# Patient Record
Sex: MALE | Race: WHITE | NOT HISPANIC OR LATINO | Employment: FULL TIME | URBAN - METROPOLITAN AREA
[De-identification: names, ages, dates, MRNs, and addresses within clinical notes are randomized per-mention and may not be internally consistent; named-entity substitution may affect disease eponyms.]

---

## 2017-08-09 ENCOUNTER — OFFICE VISIT (OUTPATIENT)
Dept: MEDICAL GROUP | Facility: MEDICAL CENTER | Age: 57
End: 2017-08-09
Payer: COMMERCIAL

## 2017-08-09 VITALS
HEART RATE: 65 BPM | WEIGHT: 194 LBS | DIASTOLIC BLOOD PRESSURE: 72 MMHG | OXYGEN SATURATION: 96 % | RESPIRATION RATE: 20 BRPM | TEMPERATURE: 98.8 F | SYSTOLIC BLOOD PRESSURE: 124 MMHG | HEIGHT: 71 IN | BODY MASS INDEX: 27.16 KG/M2

## 2017-08-09 DIAGNOSIS — Z00.00 PREVENTATIVE HEALTH CARE: ICD-10-CM

## 2017-08-09 DIAGNOSIS — Z12.5 SCREENING FOR PROSTATE CANCER: ICD-10-CM

## 2017-08-09 DIAGNOSIS — M10.9 GOUT INVOLVING TOE, UNSPECIFIED CAUSE, UNSPECIFIED CHRONICITY, UNSPECIFIED LATERALITY: ICD-10-CM

## 2017-08-09 DIAGNOSIS — Z23 NEED FOR VACCINATION: ICD-10-CM

## 2017-08-09 DIAGNOSIS — D17.24 LIPOMA OF LEFT LOWER EXTREMITY: ICD-10-CM

## 2017-08-09 PROCEDURE — 99214 OFFICE O/P EST MOD 30 MIN: CPT | Performed by: PHYSICIAN ASSISTANT

## 2017-08-09 RX ORDER — INDOMETHACIN 50 MG/1
50 CAPSULE ORAL 3 TIMES DAILY
Qty: 30 CAP | Refills: 2 | Status: SHIPPED | OUTPATIENT
Start: 2017-08-09 | End: 2019-06-29

## 2017-08-09 ASSESSMENT — PATIENT HEALTH QUESTIONNAIRE - PHQ9: CLINICAL INTERPRETATION OF PHQ2 SCORE: 0

## 2017-08-09 NOTE — PROGRESS NOTES
Subjective:   CC: Abelino Dick is a 57 y.o. male here today for new onset gout attack.    Gout  Had a gout attack in L big toe 3 week ago. Then pain affected L knee and then R big toe. Had one attack 2-3 yrs ago. Pt stopped fish oil and baby aspirin. Started taking ibuprofen 600 mg 3-4 day for pain. Pain and swelling and erythema all resolved now. Patient gets gout attacks may infrequently. States recently his job has been more stressful and he has been drinking and eating Mexican food including beans a lot.     Patient also complains of a lump in the left lower back that has been there for several years. States it was evaluated in the past with ultrasound and he was told it is a benign fatty tumor. States since he has lost weight it is more noticeable causing discomfort.         Current medicines (including changes today)  Current Outpatient Prescriptions   Medication Sig Dispense Refill   • indomethacin (INDOCIN) 50 MG Cap Take 1 Cap by mouth 3 times a day. Until pain resolved 30 Cap 2   • colchicine (COLCRYS) 0.6 MG TABS Take 1 Tab by mouth every day. 2 tabs PO at onset of flare then 1 tab PO 1 hour later.  Total dose 1.8 mg. (Patient not taking: Reported on 8/9/2017) 3 Tab 3   • methylPREDNISolone (MEDROL DOSPACK) 4 MG TABS Take as directed (Patient not taking: Reported on 8/9/2017) 1 Tab 0   • hydrocodone-acetaminophen (NORCO) 7.5-325 MG per tablet Take 1-2 Tabs by mouth every 6 hours as needed. (Patient not taking: Reported on 8/9/2017) 20 Tab 0   • aspirin 81 MG tablet Take 81 mg by mouth every day.       No current facility-administered medications for this visit.         Past medical, surgical, family, and social history are reviewed in Epic chart by me today.   Medications and allergies reviewed in Epic chart by me today.         ROS   No chest pain, no shortness of breath, no abdominal pain  As documented in history of present illness above     Objective:     Blood pressure 124/72, pulse 65, temperature  "37.1 °C (98.8 °F), resp. rate 20, height 1.803 m (5' 10.98\"), weight 87.998 kg (194 lb), SpO2 96 %. Body mass index is 27.07 kg/(m^2).   Physical Exam:  Constitutional: Alert, oriented in no acute distress.  Psych: Eye contact is good, speech goal directed, affect calm  Eyes: Conjunctiva non-injected, sclera non-icteric.    Skin: no lesions in visible areas.  Ext: no edema, color normal, vascularity normal, temperature normal        Assessment and Plan:   The following treatment plan was discussed    1. Preventative health care    - CBC WITH DIFFERENTIAL; Future  - COMP METABOLIC PANEL; Future  - LIPID PROFILE; Future  - TSH WITH REFLEX TO FT4; Future  - VITAMIN D,25 HYDROXY; Future    2. Gout involving toe, unspecified cause, unspecified chronicity, unspecified laterality  Patient gets gout attacks infrequently. Does not want to do everyday by mouth   We'll treat symptomatically when gout attack happens.  - URIC ACID, SERUM  - indomethacin (INDOCIN) 50 MG Cap; Take 1 Cap by mouth 3 times a day. Until pain resolved  Dispense: 30 Cap; Refill: 2    3. Screening for prostate cancer    - PROSTATE SPECIFIC AG SCREENING; Future    4. Lipoma of left lower extremity    - REFERRAL TO GENERAL SURGERY    5. Need for vaccination  Patient declined Tdap today      Followup: Return if symptoms worsen or fail to improve.         Please note that this dictation was created using voice recognition software. I have made every reasonable attempt to correct obvious errors, but I expect that there are errors of grammar and possibly content that I did not discover before finalizing the note.    "

## 2017-08-09 NOTE — ASSESSMENT & PLAN NOTE
Had a gout attack in L big toe 3 week ago. Then pain affected L knee and then R big toe. Had one attack 2-3 yrs ago. Pt stopped fish oil and baby aspirin. Started taking ibuprofen 600 mg 3-4 day for pain. Pain and swelling and erythema all resolved now.

## 2019-06-29 ENCOUNTER — OFFICE VISIT (OUTPATIENT)
Dept: URGENT CARE | Facility: CLINIC | Age: 59
End: 2019-06-29
Payer: COMMERCIAL

## 2019-06-29 VITALS
WEIGHT: 194 LBS | HEART RATE: 62 BPM | TEMPERATURE: 98.5 F | SYSTOLIC BLOOD PRESSURE: 140 MMHG | OXYGEN SATURATION: 95 % | RESPIRATION RATE: 16 BRPM | BODY MASS INDEX: 27.16 KG/M2 | HEIGHT: 71 IN | DIASTOLIC BLOOD PRESSURE: 88 MMHG

## 2019-06-29 DIAGNOSIS — K04.7 DENTAL INFECTION: ICD-10-CM

## 2019-06-29 PROCEDURE — 99204 OFFICE O/P NEW MOD 45 MIN: CPT | Performed by: NURSE PRACTITIONER

## 2019-06-29 RX ORDER — AMOXICILLIN AND CLAVULANATE POTASSIUM 875; 125 MG/1; MG/1
1 TABLET, FILM COATED ORAL 2 TIMES DAILY
Qty: 14 TAB | Refills: 0 | Status: SHIPPED | OUTPATIENT
Start: 2019-06-29 | End: 2019-07-06

## 2019-06-29 NOTE — PROGRESS NOTES
Chief Complaint   Patient presents with   • Dental Pain     x yesterday, Lt. lowe tooth pain and pain on Lt. jaw       HISTORY OF PRESENT ILLNESS: Patient is a 59 y.o. male who presents to urgent care today with complaints of dental pain.  The patient reports that for the past 3 days he has had pain to his left lower most posterior molar.  He has an appointment with a dentist on Monday but would like to be evaluated today.  He denies any fever, chills, malaise.  He is taken ibuprofen for symptom relief.  He notes breaking this tooth several years ago, has not had problems since.    Patient Active Problem List    Diagnosis Date Noted   • Left foot pain 2015   • Gout 2015   • Hyperlipidemia 2015       Allergies:Patient has no known allergies.    Current Outpatient Prescriptions Ordered in [facility]   Medication Sig Dispense Refill   • amoxicillin-clavulanate (AUGMENTIN) 875-125 MG Tab Take 1 Tab by mouth 2 times a day for 7 days. 14 Tab 0   • aspirin 81 MG tablet Take 81 mg by mouth every day.       No current [facility]-ordered facility-administered medications on file.        Past Medical History:   Diagnosis Date   • Gout        Social History   Substance Use Topics   • Smoking status: Never Smoker   • Smokeless tobacco: Never Used   • Alcohol use Yes      Comment: occ a beer       Family Status   Relation Status   • Mo    • Fa    • Sis Alive     Family History   Problem Relation Age of Onset   • Cancer Mother         Lung CA   • Heart Disease Father        ROS:  Review of Systems   Constitutional: Negative for fever, chills, weight loss, malaise, and fatigue.   HENT: Positive for dental pain.  Negative for ear pain, nosebleeds, congestion, sore throat and neck pain.    Eyes: Negative for vision changes.   Neuro: Negative for headache, sensory changes, weakness, seizure, LOC.   Cardiovascular: Negative for chest pain, palpitations, orthopnea and leg swelling.   Respiratory: Negative for  "cough, sputum production, shortness of breath and wheezing.   Gastrointestinal: Negative for abdominal pain, nausea, vomiting or diarrhea.   Musculoskeletal: Negative for falls, neck pain, back pain, joint pain, myalgias.   Skin: Negative for rash, diaphoresis.     Exam:  /88 (BP Location: Left arm, Patient Position: Sitting, BP Cuff Size: Adult)   Pulse 62   Temp 36.9 °C (98.5 °F) (Temporal)   Resp 16   Ht 1.803 m (5' 10.98\")   Wt 88 kg (194 lb)   SpO2 95%   General: well-nourished, well-developed male in NAD  Head: normocephalic, atraumatic  Eyes: PERRLA, no conjunctival injection, acuity grossly intact, lids normal.  Ears: normal shape and symmetry, no tenderness, no discharge. External canals are without any significant edema or erythema. Tympanic membranes are without any inflammation, no effusion. Gross auditory acuity is intact.  Nose: symmetrical without tenderness, no discharge.  Mouth/Throat: Scattered caries throughout.  Left, bottom, most posterior molar appears to have old fracture, surrounding tissue is mildly swollen and tender, no abscess or significant erythema, no drainage.  Able to fully masticate without difficulty.  No pharyngeal erythema, exudates or tonsillar enlargement.  Neck: no masses, range of motion within normal limits, no tracheal deviation. No obvious thyroid enlargement.   Lymph: no cervical adenopathy. No supraclavicular adenopathy.   Neuro: alert and oriented. Cranial nerves 1-12 grossly intact. No sensory deficit.   Cardiovascular: regular rate and rhythm. No edema.  Pulmonary: no distress. Chest is symmetrical with respiration, no wheezes, crackles, or rhonchi.   Musculoskeletal: no clubbing, appropriate muscle tone, gait is stable.  Skin: warm, dry, intact, no clubbing, no cyanosis, no rashes.   Psych: appropriate mood, affect, judgement.         Assessment/Plan:  1. Dental infection  amoxicillin-clavulanate (AUGMENTIN) 875-125 MG Tab       Suspect early infection, " Augmentin as directed.  Probiotic use encouraged.  OTC Tylenol Motrin for pain relief.  Follow-up with dentist on Monday as planned.  Supportive care, differential diagnoses, and indications for immediate follow-up discussed with patient.   Pathogenesis of diagnosis discussed including typical length and natural progression.   Instructed to return to clinic or nearest emergency department for any change in condition, further concerns, or worsening of symptoms.  Patient states understanding of the plan of care and discharge instructions.          Please note that this dictation was created using voice recognition software. I have made every reasonable attempt to correct obvious errors, but I expect that there are errors of grammar and possibly content that I did not discover before finalizing the note.      SHIELA Durbin.

## 2020-02-10 ENCOUNTER — TELEPHONE (OUTPATIENT)
Dept: SCHEDULING | Facility: IMAGING CENTER | Age: 60
End: 2020-02-10

## 2020-02-12 ENCOUNTER — APPOINTMENT (OUTPATIENT)
Dept: RADIOLOGY | Facility: MEDICAL CENTER | Age: 60
End: 2020-02-12
Attending: EMERGENCY MEDICINE
Payer: COMMERCIAL

## 2020-02-12 ENCOUNTER — APPOINTMENT (OUTPATIENT)
Dept: RADIOLOGY | Facility: MEDICAL CENTER | Age: 60
End: 2020-02-12
Attending: PSYCHIATRY & NEUROLOGY
Payer: COMMERCIAL

## 2020-02-12 ENCOUNTER — APPOINTMENT (OUTPATIENT)
Dept: CARDIOLOGY | Facility: MEDICAL CENTER | Age: 60
End: 2020-02-12
Attending: PSYCHIATRY & NEUROLOGY
Payer: COMMERCIAL

## 2020-02-12 ENCOUNTER — HOSPITAL ENCOUNTER (EMERGENCY)
Facility: MEDICAL CENTER | Age: 60
End: 2020-02-12
Attending: EMERGENCY MEDICINE
Payer: COMMERCIAL

## 2020-02-12 VITALS
OXYGEN SATURATION: 95 % | DIASTOLIC BLOOD PRESSURE: 100 MMHG | WEIGHT: 201.5 LBS | RESPIRATION RATE: 20 BRPM | SYSTOLIC BLOOD PRESSURE: 151 MMHG | HEIGHT: 71 IN | BODY MASS INDEX: 28.21 KG/M2 | HEART RATE: 64 BPM | TEMPERATURE: 98.2 F

## 2020-02-12 DIAGNOSIS — I63.10 CEREBROVASCULAR ACCIDENT (CVA) DUE TO EMBOLISM OF PRECEREBRAL ARTERY (HCC): ICD-10-CM

## 2020-02-12 DIAGNOSIS — G45.9 TIA (TRANSIENT ISCHEMIC ATTACK): ICD-10-CM

## 2020-02-12 LAB
ALBUMIN SERPL BCP-MCNC: 4.8 G/DL (ref 3.2–4.9)
ALBUMIN/GLOB SERPL: 1.5 G/DL
ALP SERPL-CCNC: 35 U/L (ref 30–99)
ALT SERPL-CCNC: 27 U/L (ref 2–50)
ANION GAP SERPL CALC-SCNC: 10 MMOL/L (ref 0–11.9)
APTT PPP: 23.2 SEC (ref 24.7–36)
AST SERPL-CCNC: 31 U/L (ref 12–45)
BASOPHILS # BLD AUTO: 0.7 % (ref 0–1.8)
BASOPHILS # BLD: 0.06 K/UL (ref 0–0.12)
BILIRUB SERPL-MCNC: 1.2 MG/DL (ref 0.1–1.5)
BUN SERPL-MCNC: 19 MG/DL (ref 8–22)
CALCIUM SERPL-MCNC: 9.8 MG/DL (ref 8.5–10.5)
CHLORIDE SERPL-SCNC: 103 MMOL/L (ref 96–112)
CHOLEST SERPL-MCNC: 234 MG/DL (ref 100–199)
CO2 SERPL-SCNC: 24 MMOL/L (ref 20–33)
CREAT SERPL-MCNC: 1.22 MG/DL (ref 0.5–1.4)
EKG IMPRESSION: NORMAL
EOSINOPHIL # BLD AUTO: 0.04 K/UL (ref 0–0.51)
EOSINOPHIL NFR BLD: 0.4 % (ref 0–6.9)
ERYTHROCYTE [DISTWIDTH] IN BLOOD BY AUTOMATED COUNT: 42.1 FL (ref 35.9–50)
EST. AVERAGE GLUCOSE BLD GHB EST-MCNC: 103 MG/DL
GLOBULIN SER CALC-MCNC: 3.1 G/DL (ref 1.9–3.5)
GLUCOSE BLD-MCNC: 96 MG/DL (ref 65–99)
GLUCOSE SERPL-MCNC: 104 MG/DL (ref 65–99)
HBA1C MFR BLD: 5.2 % (ref 0–5.6)
HCT VFR BLD AUTO: 51.2 % (ref 42–52)
HDLC SERPL-MCNC: 62 MG/DL
HGB BLD-MCNC: 17.7 G/DL (ref 14–18)
IMM GRANULOCYTES # BLD AUTO: 0.07 K/UL (ref 0–0.11)
IMM GRANULOCYTES NFR BLD AUTO: 0.8 % (ref 0–0.9)
LDLC SERPL CALC-MCNC: 143 MG/DL
LV EJECT FRACT  99904: 60
LV EJECT FRACT MOD 2C 99903: 61.88
LV EJECT FRACT MOD 4C 99902: 57.53
LV EJECT FRACT MOD BP 99901: 58
LYMPHOCYTES # BLD AUTO: 1.55 K/UL (ref 1–4.8)
LYMPHOCYTES NFR BLD: 17 % (ref 22–41)
MCH RBC QN AUTO: 31.9 PG (ref 27–33)
MCHC RBC AUTO-ENTMCNC: 34.6 G/DL (ref 33.7–35.3)
MCV RBC AUTO: 92.4 FL (ref 81.4–97.8)
MONOCYTES # BLD AUTO: 0.64 K/UL (ref 0–0.85)
MONOCYTES NFR BLD AUTO: 7 % (ref 0–13.4)
NEUTROPHILS # BLD AUTO: 6.78 K/UL (ref 1.82–7.42)
NEUTROPHILS NFR BLD: 74.1 % (ref 44–72)
NRBC # BLD AUTO: 0 K/UL
NRBC BLD-RTO: 0 /100 WBC
PLATELET # BLD AUTO: 321 K/UL (ref 164–446)
PMV BLD AUTO: 9.9 FL (ref 9–12.9)
POTASSIUM SERPL-SCNC: 4.8 MMOL/L (ref 3.6–5.5)
PROT SERPL-MCNC: 7.9 G/DL (ref 6–8.2)
RBC # BLD AUTO: 5.54 M/UL (ref 4.7–6.1)
SODIUM SERPL-SCNC: 137 MMOL/L (ref 135–145)
TRIGL SERPL-MCNC: 145 MG/DL (ref 0–149)
TROPONIN T SERPL-MCNC: 11 NG/L (ref 6–19)
WBC # BLD AUTO: 9.1 K/UL (ref 4.8–10.8)

## 2020-02-12 PROCEDURE — 93306 TTE W/DOPPLER COMPLETE: CPT

## 2020-02-12 PROCEDURE — 81240 F2 GENE: CPT

## 2020-02-12 PROCEDURE — 36415 COLL VENOUS BLD VENIPUNCTURE: CPT

## 2020-02-12 PROCEDURE — 85303 CLOT INHIBIT PROT C ACTIVITY: CPT

## 2020-02-12 PROCEDURE — 80061 LIPID PANEL: CPT

## 2020-02-12 PROCEDURE — A9270 NON-COVERED ITEM OR SERVICE: HCPCS | Performed by: EMERGENCY MEDICINE

## 2020-02-12 PROCEDURE — 93880 EXTRACRANIAL BILAT STUDY: CPT

## 2020-02-12 PROCEDURE — 85300 ANTITHROMBIN III ACTIVITY: CPT

## 2020-02-12 PROCEDURE — 700102 HCHG RX REV CODE 250 W/ 637 OVERRIDE(OP): Performed by: PSYCHIATRY & NEUROLOGY

## 2020-02-12 PROCEDURE — 84484 ASSAY OF TROPONIN QUANT: CPT

## 2020-02-12 PROCEDURE — 85306 CLOT INHIBIT PROT S FREE: CPT

## 2020-02-12 PROCEDURE — 99285 EMERGENCY DEPT VISIT HI MDM: CPT

## 2020-02-12 PROCEDURE — 94760 N-INVAS EAR/PLS OXIMETRY 1: CPT

## 2020-02-12 PROCEDURE — 93306 TTE W/DOPPLER COMPLETE: CPT | Mod: 26 | Performed by: INTERNAL MEDICINE

## 2020-02-12 PROCEDURE — 85730 THROMBOPLASTIN TIME PARTIAL: CPT

## 2020-02-12 PROCEDURE — 81241 F5 GENE: CPT

## 2020-02-12 PROCEDURE — 700102 HCHG RX REV CODE 250 W/ 637 OVERRIDE(OP): Performed by: EMERGENCY MEDICINE

## 2020-02-12 PROCEDURE — 80053 COMPREHEN METABOLIC PANEL: CPT

## 2020-02-12 PROCEDURE — 71045 X-RAY EXAM CHEST 1 VIEW: CPT

## 2020-02-12 PROCEDURE — 70496 CT ANGIOGRAPHY HEAD: CPT

## 2020-02-12 PROCEDURE — 83036 HEMOGLOBIN GLYCOSYLATED A1C: CPT

## 2020-02-12 PROCEDURE — 70498 CT ANGIOGRAPHY NECK: CPT

## 2020-02-12 PROCEDURE — 700117 HCHG RX CONTRAST REV CODE 255: Performed by: PSYCHIATRY & NEUROLOGY

## 2020-02-12 PROCEDURE — 99284 EMERGENCY DEPT VISIT MOD MDM: CPT | Performed by: NURSE PRACTITIONER

## 2020-02-12 PROCEDURE — 700105 HCHG RX REV CODE 258: Performed by: PSYCHIATRY & NEUROLOGY

## 2020-02-12 PROCEDURE — A9270 NON-COVERED ITEM OR SERVICE: HCPCS | Performed by: PSYCHIATRY & NEUROLOGY

## 2020-02-12 PROCEDURE — 85025 COMPLETE CBC W/AUTO DIFF WBC: CPT

## 2020-02-12 PROCEDURE — 93005 ELECTROCARDIOGRAM TRACING: CPT | Performed by: EMERGENCY MEDICINE

## 2020-02-12 PROCEDURE — 82962 GLUCOSE BLOOD TEST: CPT

## 2020-02-12 PROCEDURE — 70551 MRI BRAIN STEM W/O DYE: CPT

## 2020-02-12 RX ORDER — ATORVASTATIN CALCIUM 20 MG/1
40 TABLET, FILM COATED ORAL EVERY EVENING
Status: DISCONTINUED | OUTPATIENT
Start: 2020-02-12 | End: 2020-02-12 | Stop reason: HOSPADM

## 2020-02-12 RX ORDER — SODIUM CHLORIDE 9 MG/ML
1000 INJECTION, SOLUTION INTRAVENOUS ONCE
Status: DISCONTINUED | OUTPATIENT
Start: 2020-02-12 | End: 2020-02-12

## 2020-02-12 RX ORDER — SODIUM CHLORIDE 9 MG/ML
500 INJECTION, SOLUTION INTRAVENOUS ONCE
Status: DISCONTINUED | OUTPATIENT
Start: 2020-02-12 | End: 2020-02-12

## 2020-02-12 RX ORDER — ATORVASTATIN CALCIUM 40 MG/1
40 TABLET, FILM COATED ORAL DAILY
Qty: 30 TAB | Refills: 0 | Status: SHIPPED | OUTPATIENT
Start: 2020-02-12 | End: 2020-03-13 | Stop reason: SDUPTHER

## 2020-02-12 RX ORDER — CLOPIDOGREL BISULFATE 75 MG/1
75 TABLET ORAL DAILY
Status: DISCONTINUED | OUTPATIENT
Start: 2020-02-12 | End: 2020-02-12

## 2020-02-12 RX ORDER — SODIUM CHLORIDE 9 MG/ML
500 INJECTION, SOLUTION INTRAVENOUS ONCE
Status: COMPLETED | OUTPATIENT
Start: 2020-02-12 | End: 2020-02-12

## 2020-02-12 RX ORDER — ATORVASTATIN CALCIUM 20 MG/1
40 TABLET, FILM COATED ORAL EVERY EVENING
Status: DISCONTINUED | OUTPATIENT
Start: 2020-02-12 | End: 2020-02-12

## 2020-02-12 RX ORDER — ASPIRIN 325 MG
325 TABLET ORAL DAILY
Status: DISCONTINUED | OUTPATIENT
Start: 2020-02-13 | End: 2020-02-12 | Stop reason: HOSPADM

## 2020-02-12 RX ADMIN — CLOPIDOGREL BISULFATE 75 MG: 75 TABLET ORAL at 10:32

## 2020-02-12 RX ADMIN — SODIUM CHLORIDE 500 ML: 9 INJECTION, SOLUTION INTRAVENOUS at 14:16

## 2020-02-12 RX ADMIN — ASPIRIN 81 MG: 81 TABLET, COATED ORAL at 10:32

## 2020-02-12 RX ADMIN — IOHEXOL 100 ML: 350 INJECTION, SOLUTION INTRAVENOUS at 12:17

## 2020-02-12 RX ADMIN — ATORVASTATIN CALCIUM 40 MG: 20 TABLET, FILM COATED ORAL at 10:32

## 2020-02-12 NOTE — ED TRIAGE NOTES
"Chief Complaint   Patient presents with   • Numbness     on Sunday pt tried a thc pen and was unable to talk. lasted 1.5 hours. today pt started to feel \"weird\", has minor numbness to the LT side of his face. pt then checked his BP and noted it was high.    • Hypertension     denies hx of it, but has an appt today with PCP.      Pt to triage for above. Ambulatory with steady gait, no speech issues at this time, A&Ox4.   BS: 96.    BP (!) 185/102   Pulse 62   Temp 36.8 °C (98.2 °F) (Temporal)   Resp 14   Ht 1.803 m (5' 11\")   Wt 91.4 kg (201 lb 8 oz)   SpO2 97%   BMI 28.10 kg/m²     "

## 2020-02-12 NOTE — PROGRESS NOTES
Spiritual Care Note    Patient Information     Patient's Name: Abelino Dick   MRN: [id number]    YOB: 1960   Age and Gender: 59 y.o. male   Service Area: ED [facility]   Room (and Bed):  10/10 RED   Ethnicity or Nationality:     Primary Language: English   Adventist/Spiritual preference: Anabaptism   Place of Residence: [place], [place]   Family/Friends/Others Present: Yes, wife   Clinical Team Present: No   Medical Diagnosis(-es)/Procedure(s): Numbness   Code Status: No Order    Date of Admission: 2/12/2020   Length of Stay: 0 days        Spiritual Care Provider Information:  Name of Spiritual Care Provider: Aure Ravi  Title of Spiritual Care Provider: Associate   Phone Number: [phone]  E-mail: [email or web address]  Total time : 15 minutes    Spiritual Screen Results:    Gen Nursing        Palliative Care         Encounter/Request Information  Encounter/Request Type   Visited With: Patient and family together  Nature of the Visit: Initial  General Visit: Yes  Referral From/ Origin of Request: [place] rounds, Verbal family    Religous Needs/Values  Adventist Needs Visit  Adventist Needs: Prayer    Spiritual Assessment     Spiritual Care Encounters    Observations/Symptoms: Accepting, Thankfulness    Interaciton/Conversation: Pt's wife requested prayer and hugged the  in thanks.    Assessment: Need    Need: Seeking Spiritual Assistance and Support    Interventions: Compassionate presence, prayer.    Outcomes: Spiritual Comfort    Plan: Visit Upon Request    Notes:

## 2020-02-12 NOTE — ED PROVIDER NOTES
"ED Provider  Scribed for Shadi Sherman D.O. by Joshua Alcaraz. 2020  10:02 AM    Means of arrival:Walk-in  History obtained from:Patient  History limited by: None    CHIEF COMPLAINT  Chief Complaint   Patient presents with   • Numbness     on  pt tried a thc pen and was unable to talk. lasted 1.5 hours. today pt started to feel \"weird\", has minor numbness to the LT side of his face. pt then checked his BP and noted it was high.    • Hypertension     denies hx of it, but has an appt today with PCP.        HPI  Abelino Dick is a 59 y.o. male who presents for acute, constant numbness to the left side of his face as well as transient dysarthria onset 1 day ago. Patient states that last  he was at a  service when he stepped outside to get some air and take a hit from his THC pen. Afterwards he went to the restroom and reports being unable to undo his belt because \"my mind wasn't work\". He and his wife states that after the patient left the restroom, he had significant difficulty producing speech that continued for the next 1-2 hours after they got home. This morning the patient drank some coffee which he states he does not normally drink, and afterwards started to feel some numbness to the left side of his face. Patient scheduled an appointment with his PCP for this afternoon, but decided to come in after measuring his blood pressure with an OTC machine and noticed it was \"high\". There are no known alleviating or exacerbating factors. Patient denies any associated vomiting or numbness/weakness to his extremities.    REVIEW OF SYSTEMS  See HPI for further details. All other systems are negative.     PAST MEDICAL HISTORY   has a past medical history of Gout.    SOCIAL HISTORY  Social History     Tobacco Use   • Smoking status: Never Smoker   • Smokeless tobacco: Never Used   Substance and Sexual Activity   • Alcohol use: Yes     Comment: occ a beer   • Drug use: Never   • Sexual activity: Yes     " "Partners: Female       SURGICAL HISTORY  patient denies any surgical history    CURRENT MEDICATIONS  Home Medications     Reviewed by Darci Munoz R.N. (Registered Nurse) on 02/12/20 at 0927  Med List Status: Partial   Medication Last Dose Status        Patient Chuck Taking any Medications                       ALLERGIES  No Known Allergies    PHYSICAL EXAM  VITAL SIGNS: BP (!) 169/93   Pulse 63   Temp 36.8 °C (98.2 °F) (Temporal)   Resp (!) 21   Ht 1.803 m (5' 11\")   Wt 91.4 kg (201 lb 8 oz)   SpO2 95%   BMI 28.10 kg/m²   Constitutional: Alert in no apparent distress.  HENT: No signs of trauma, mucous membranes are moist  Eyes: Conjunctiva normal, Non-icteric.   Neck: Normal range of motion, No tenderness, Supple.  Lymphatic: No lymphadenopathy noted.   Cardiovascular: Regular rate and rhythm, no murmurs.   Thorax & Lungs: Normal breath sounds, No respiratory distress, No wheezing, No chest tenderness.   Abdomen: Bowel sounds normal, Soft, No tenderness, No masses, No pulsatile masses. No peritoneal signs.  Skin: Warm, Dry, normal color.   Back: No bony tenderness, No CVA tenderness.   Extremities: No edema, No tenderness, No cyanosis  Musculoskeletal: Good range of motion in all major joints. No tenderness to palpation or major deformities noted.   Neurologic: NIH score of 0. Alert and oriented x4, Normal motor function, Normal sensory function, No focal deficits noted.   Psychiatric: Affect normal, Judgment normal, Mood normal.       MEDICAL DECISION MAKING  This is a 59 y.o. male who presents with complaints of expressive a aphasia that occurred on Sunday.  He did have some residual left facial paresthesia but at the time of presentation he was completely asymptomatic.  This symptoms sounded like a possible TIA.  With that the patient was evaluated for stroke.  His MRI does confirm he did have a small stroke, he does have a suspicious right to left shunt on echocardiogram.  I was in contact with " .  I spoke with the neurologist after results were obtained, the plan at this time will be to place the patient on aspirin and a statin.  He is asymptomatic and is stable for discharge home.  Additional coagulation panels have been ordered by the neurologist and I spoke with the patient concerning the findings the need to take aspirin on a daily basis and a prescription for Lipitor has been written.    I spoke with the lab, the lab will come and draw the new coagulation panels, and he would be discharged home after lab draw he will not have to wait for results.  He is to follow-up with Dr. scanlon in the office  DIAGNOSTIC STUDIES / PROCEDURES    EKG  12 Lead EKG interpreted by me shown below.      LABS  Results for orders placed or performed during the hospital encounter of 02/12/20   EC-ECHOCARDIOGRAM COMPLETE W/O CONT   Result Value Ref Range    Eject.Frac. MOD BP 58     Eject.Frac. MOD 4C 57.53     Eject.Frac. MOD 2C 61.88     Left Ventrical Ejection Fraction 60    CBC w/ Differential   Result Value Ref Range    WBC 9.1 4.8 - 10.8 K/uL    RBC 5.54 4.70 - 6.10 M/uL    Hemoglobin 17.7 14.0 - 18.0 g/dL    Hematocrit 51.2 42.0 - 52.0 %    MCV 92.4 81.4 - 97.8 fL    MCH 31.9 27.0 - 33.0 pg    MCHC 34.6 33.7 - 35.3 g/dL    RDW 42.1 35.9 - 50.0 fL    Platelet Count 321 164 - 446 K/uL    MPV 9.9 9.0 - 12.9 fL    Neutrophils-Polys 74.10 (H) 44.00 - 72.00 %    Lymphocytes 17.00 (L) 22.00 - 41.00 %    Monocytes 7.00 0.00 - 13.40 %    Eosinophils 0.40 0.00 - 6.90 %    Basophils 0.70 0.00 - 1.80 %    Immature Granulocytes 0.80 0.00 - 0.90 %    Nucleated RBC 0.00 /100 WBC    Neutrophils (Absolute) 6.78 1.82 - 7.42 K/uL    Lymphs (Absolute) 1.55 1.00 - 4.80 K/uL    Monos (Absolute) 0.64 0.00 - 0.85 K/uL    Eos (Absolute) 0.04 0.00 - 0.51 K/uL    Baso (Absolute) 0.06 0.00 - 0.12 K/uL    Immature Granulocytes (abs) 0.07 0.00 - 0.11 K/uL    NRBC (Absolute) 0.00 K/uL   Complete Metabolic Panel (CMP)   Result Value Ref  Range    Sodium 137 135 - 145 mmol/L    Potassium 4.8 3.6 - 5.5 mmol/L    Chloride 103 96 - 112 mmol/L    Co2 24 20 - 33 mmol/L    Anion Gap 10.0 0.0 - 11.9    Glucose 104 (H) 65 - 99 mg/dL    Bun 19 8 - 22 mg/dL    Creatinine 1.22 0.50 - 1.40 mg/dL    Calcium 9.8 8.5 - 10.5 mg/dL    AST(SGOT) 31 12 - 45 U/L    ALT(SGPT) 27 2 - 50 U/L    Alkaline Phosphatase 35 30 - 99 U/L    Total Bilirubin 1.2 0.1 - 1.5 mg/dL    Albumin 4.8 3.2 - 4.9 g/dL    Total Protein 7.9 6.0 - 8.2 g/dL    Globulin 3.1 1.9 - 3.5 g/dL    A-G Ratio 1.5 g/dL   Troponin STAT   Result Value Ref Range    Troponin T 11 6 - 19 ng/L   Lipid Profile   Result Value Ref Range    Cholesterol,Tot 234 (H) 100 - 199 mg/dL    Triglycerides 145 0 - 149 mg/dL    HDL 62 >=40 mg/dL     (H) <100 mg/dL   HEMOGLOBIN A1C   Result Value Ref Range    Glycohemoglobin 5.2 0.0 - 5.6 %    Est Avg Glucose 103 mg/dL   ESTIMATED GFR   Result Value Ref Range    GFR If African American >60 >60 mL/min/1.73 m 2    GFR If Non African American >60 >60 mL/min/1.73 m 2   ACCU-CHEK GLUCOSE   Result Value Ref Range    Glucose - Accu-Ck 96 65 - 99 mg/dL   EKG   Result Value Ref Range    Report       [facility]    Test Date:  2020  Pt Name:    HUNG GRAHAM                 Department: ER  MRN:        [id number]                      Room:       RD 10  Gender:     Male                         Technician: [id number]  :        1960                   Requested By:MELVIN CORONA  Order #:    [id number]                    Reading MD: MELVIN CORONA, D.O.    Measurements  Intervals                                Axis  Rate:       59                           P:          65  FL:         192                          QRS:        17  QRSD:       106                          T:          37  QT:         468  QTc:        464    Interpretive Statements  SINUS BRADYCARDIA  PROBABLE LEFT ATRIAL ABNORMALITY  BORDERLINE INTRAVENTRICULAR CONDUCTION  DELAY  No previous ECG available for comparison  Electronically Signed On 2- 15:38:14 PST by MELVIN CORONA D.O.       All labs reviewed by me.    RADIOLOGY  EC-ECHOCARDIOGRAM COMPLETE W/O CONT   Final Result      MR-BRAIN-W/O   Final Result      1.  Areas of acute ischemia in the LEFT temporal cortex extending to the LEFT occipital and parietal cortex   2.  No hemorrhage   3.  Mild atrophy   4.  Mild white matter changes      CT-CTA HEAD WITH & W/O-POST PROCESS   Final Result      CT angiogram of the [place] within normal limits for age with intracranial atherosclerosis.      CT-CTA NECK WITH & W/O-POST PROCESSING   Final Result      1. No evidence of flow-limiting stenosis in the cervical carotid or cervical vertebral arteries.      DX-CHEST-PORTABLE (1 VIEW)   Final Result         Patchy bibasilar opacities, likely atelectasis.      Cardiomegaly.      US-CAROTID DOPPLER BILAT   Final Result        The radiologist's interpretations of all radiological studies have been reviewed by me.        COURSE  Pertinent Labs & Imaging studies reviewed. (See chart for details)    10:02 AM - Patient seen and examined at bedside. Discussed plan of care. Ordered for DX-chest, CBC with diff, CMP, troponin, accu-check glucose, and EKG to evaluate his symptoms.     10:11 AM - Neuro called    10:16 AM - I discussed the patient's case and the above findings with Dr. Madrigal (Neuro) who will have his APRN evaluate the patient, and recommends MRI.     1750 I spoke with Dr. jaramillo back in the department here, Dr. Smith did evaluate and talk with the patient also.  An additional study requested is a Ziehl patch.  I spoke with scheduling and the patient has an appointment made with Dr. Devi for March 13 at 1230.  I did go back to speak with the patient concerning this new appointment and that he will comply.    Critical care time 30 minutes    FINAL IMPRESSION  1. Cerebrovascular accident (CVA) due to embolism of  precerebral artery (HCC)         I, Joshua Alcaraz (Scribe), am scribing for, and in the presence of, Shadi Sherman D.O..    Electronically signed by: Joshua Alcaraz (Scribe), 2/12/2020    Shadi CHILDRESS D.O. personally performed the services described in this documentation, as scribed by Joshua Alcaraz in my presence, and it is both accurate and complete. C.    The note accurately reflects work and decisions made by me.  Shadi Sherman D.O.  2/12/2020  3:49 PM

## 2020-02-12 NOTE — DISCHARGE INSTRUCTIONS
Your MRI does confirm a small stroke.  You have no symptoms of it at this time so you are stable for discharge home.  Please take 1 full aspirin daily, please take the Lipitor as prescribed.    Additional labs are being run, and you are being referred to Dr. Madrigal the neurologist for follow-up.  Please call their office to make a follow-up appointment.  Return if any symptoms change or worsen    You have an appointment set with Dr. Stevens the cardiologist for March 13 at 1230 please call the office to confirm the appointment.

## 2020-02-12 NOTE — CONSULTS
"Neurology Initial Consult H&P  Neurohospitalist Service, [facility]    Referring Physician: Shadi Sherman D.O.    Chief Complaint   Patient presents with   • Numbness     on  pt tried a thc pen and was unable to talk. lasted 1.5 hours. today pt started to feel \"weird\", has minor numbness to the LT side of his face. pt then checked his BP and noted it was high.    • Hypertension     denies hx of it, but has an appt today with PCP.        HPI: Abelino Dick is a 59 y.o. male with history of gout and daily marijuana use presenting to the hospital for numbness and consulted for potential TIA/CVA workup. Patient states on  at approximately 14:00 being at a friend's  where he stepped outside, took a hit from a THC pen, and shortly after had difficulty undoing pants to use the bathroom, difficulty speaking, and right had tingling. The right hand tingling lasted only for a few seconds, but the difficulty performing tasks and speaking lasted for approximately 2 hours. No factors aggravated the symptoms, and the patient rested and drank water to help to relief the symptoms. This morning the patient drank some coffee, which is not normal for him, after which he began to feel weird and felt left facial numbness. He checked his blood pressure at a [facility] kiosk and stated his blood pressure was high at 175/100 which prompted him and his wife to come to the [facility] Emergency Department today.        Past Medical History:    has a past medical history of Gout.    FHx:  family history includes Cancer in his mother; Heart Disease in his father.    SHx:   reports that he has never smoked. He has never used smokeless tobacco. He reports current alcohol use. He reports that he does not use drugs. Pt currently smokes marijuana daily.     Allergies:  No Known Allergies    Medications:    Current Facility-Administered Medications:   •  aspirin EC (ECOTRIN) tablet 81 mg, 81 mg, Oral, DAILY, Keagan PHILLIPS" ADONIS Madrigal, 81 mg at 02/12/20 1032  •  clopidogrel (PLAVIX) tablet 75 mg, 75 mg, Oral, DAILY, Keagan Madrigal M.D., 75 mg at 02/12/20 1032  •  atorvastatin (LIPITOR) tablet 40 mg, 40 mg, Oral, Q EVENING, Shadi Sherman D.OCatrachito, 40 mg at 02/12/20 1032  •  NS (BOLUS) infusion 500 mL, 500 mL, Intravenous, Once, Keagan Madrigal M.D.  No current outpatient medications on file.    Review of systems:   Constitutional: denies fever, night sweats, weight loss.   Eyes: denies acute vision change, eye pain or secretion.   Ears, Nose, Mouth, Throat: denies nasal secretion, nasal bleeding, difficulty swallowing, hearing loss, tinnitus, vertigo, ear pain, acute dental problems, oral ulcers or lesions.   Endocrine: denies recent weight changes, heat or cold intolerance, polyuria, polydypsia, polyphagia,abnormal hair growth.  Cardiovascular: denies new onset of chest pain, palpitations, syncope, or dyspnea of exertion.  Pulmonary: denies shortness of breath, new onset of cough, hemoptysis, wheezing, chest pain or flu-like symptoms.   GI: denies nausea, vomiting, diarrhea, GI bleeding, change in appetite, abdominal pain, and change in bowel habits.  : denies dysuria, urinary incontinence, hematuria.  Heme/oncology: denies history of easy bruising or bleeding. No history of cancer, DVTor PE.  Allergy/immunology: denies hives/urticaria, or itching.   Dermatologic: denies new rash, or new skin lesions.  Musculoskeletal:denies joint swelling or pain, muscle pain, neck and back pain.   Neurologic: Migraines about 3 times a year; denies acute visual changes, facial droopiness, muscle weakness (focal or generalized), paresthesias, anesthesia, ataxia, change in speech or language, memory loss, abnormal movements, seizures, loss of consciousness, or episodes of confusion.   Psychiatric: denies symptoms of depression, anxiety, hallucinations, mood swings or changes, suicidal or homicidal thoughts.     Physical Examination:     Vitals:     02/12/20 1015 02/12/20 1045 02/12/20 1145 02/12/20 1215   BP: 137/98 147/87 160/109 (!) 180/97   Pulse: 62 65 67 66   Resp: 19 20 (!) 21 20   Temp:       TempSrc:       SpO2: 94% 97% 95% 92%   Weight:       Height:           General: Patient in no acute distress, pleasant and cooperative.  HEENT: Normocephalic, no signs of acute trauma.   Neck: supple, no meningeal signs or carotid bruits. There is normal range of motion. No tenderness on exam.   Chest: clear to auscultation. No cough.   CV: RRR, no murmurs.   Skin: no signs of acute rashes or trauma.   Musculoskeletal: joints exhibit full range of motion, without any pain to palpation. There are no signs of joint or muscle swelling. There is no tenderness to deep palpation of muscles.   Psychiatric: No hallucinatory behavior. Denies symptoms of depression or suicidal ideation. Mood and affect appear normal on exam.     NEUROLOGICAL EXAM:   Mental status, orientation: Awake, alert and fully oriented.   Speech and language: speech is clear and fluent. The patient is able to name, repeat and comprehend.   Memory: There is intact recollection of recent and remote events.   Cranial nerve exam: Pupils are 3-4 mm bilaterally and equally reactive to light and accommodation. Visual fields are intact by confrontation. There is no nystagmus on primary or secondary gaze. Intact full EOM in all directions of gaze. Face appears symmetric. Sensation in the face is intact to light touch. Uvula is midline. Palate elevates symmetrically. Tongue is midline and without any signs of tongue biting or fasciculations. Sternocleidomastoid muscles exhibit is normal strength bilaterally. Shoulder shrug is intact bilaterally.   Motor exam: Strength is 5/5 in all extremities. Tone is normal. No abnormal movements were seen on exam.   Sensory exam reveals normal sense of light touch, proprioception, vibration and pinprick in all extremities.   Deep tendon reflexes:  2+ throughout. Plantar  responses are flexor. There is no clonus.   Coordination: shows a normal finger-nose-finger. Normal rapidly alternating movements.   Gait: Deferred due to fall risk.      NIH Stroke Scale    1a Level of Consciousness 0  1b Orientation Questions 0  1c Response to Commands 0  2 Gaze 0  3 Visual Fields 0  4 Facial Movement 0  5 Motor Function (arm) 0  a Left   b Right   6 Motor Function (leg) 0  a Left   b Right   7 Limb Ataxia 0  8 Sensory 0  9 Language 0  10 Articulation 0  11 Extinction/Inattention 0    Score: 0    Lab Data Review:  Recent Results (from the past 24 hour(s))   ACCU-CHEK GLUCOSE    Collection Time: 02/12/20  9:29 AM   Result Value Ref Range    Glucose - Accu-Ck 96 65 - 99 mg/dL   CBC w/ Differential    Collection Time: 02/12/20  9:45 AM   Result Value Ref Range    WBC 9.1 4.8 - 10.8 K/uL    RBC 5.54 4.70 - 6.10 M/uL    Hemoglobin 17.7 14.0 - 18.0 g/dL    Hematocrit 51.2 42.0 - 52.0 %    MCV 92.4 81.4 - 97.8 fL    MCH 31.9 27.0 - 33.0 pg    MCHC 34.6 33.7 - 35.3 g/dL    RDW 42.1 35.9 - 50.0 fL    Platelet Count 321 164 - 446 K/uL    MPV 9.9 9.0 - 12.9 fL    Neutrophils-Polys 74.10 (H) 44.00 - 72.00 %    Lymphocytes 17.00 (L) 22.00 - 41.00 %    Monocytes 7.00 0.00 - 13.40 %    Eosinophils 0.40 0.00 - 6.90 %    Basophils 0.70 0.00 - 1.80 %    Immature Granulocytes 0.80 0.00 - 0.90 %    Nucleated RBC 0.00 /100 WBC    Neutrophils (Absolute) 6.78 1.82 - 7.42 K/uL    Lymphs (Absolute) 1.55 1.00 - 4.80 K/uL    Monos (Absolute) 0.64 0.00 - 0.85 K/uL    Eos (Absolute) 0.04 0.00 - 0.51 K/uL    Baso (Absolute) 0.06 0.00 - 0.12 K/uL    Immature Granulocytes (abs) 0.07 0.00 - 0.11 K/uL    NRBC (Absolute) 0.00 K/uL   Complete Metabolic Panel (CMP)    Collection Time: 02/12/20  9:45 AM   Result Value Ref Range    Sodium 137 135 - 145 mmol/L    Potassium 4.8 3.6 - 5.5 mmol/L    Chloride 103 96 - 112 mmol/L    Co2 24 20 - 33 mmol/L    Anion Gap 10.0 0.0 - 11.9    Glucose 104 (H) 65 - 99 mg/dL    Bun 19 8 - 22 mg/dL     Creatinine 1.22 0.50 - 1.40 mg/dL    Calcium 9.8 8.5 - 10.5 mg/dL    AST(SGOT) 31 12 - 45 U/L    ALT(SGPT) 27 2 - 50 U/L    Alkaline Phosphatase 35 30 - 99 U/L    Total Bilirubin 1.2 0.1 - 1.5 mg/dL    Albumin 4.8 3.2 - 4.9 g/dL    Total Protein 7.9 6.0 - 8.2 g/dL    Globulin 3.1 1.9 - 3.5 g/dL    A-G Ratio 1.5 g/dL   Troponin STAT    Collection Time: 20  9:45 AM   Result Value Ref Range    Troponin T 11 6 - 19 ng/L   Lipid Profile    Collection Time: 20  9:45 AM   Result Value Ref Range    Cholesterol,Tot 234 (H) 100 - 199 mg/dL    Triglycerides 145 0 - 149 mg/dL    HDL 62 >=40 mg/dL     (H) <100 mg/dL   HEMOGLOBIN A1C    Collection Time: 20  9:45 AM   Result Value Ref Range    Glycohemoglobin 5.2 0.0 - 5.6 %    Est Avg Glucose 103 mg/dL   ESTIMATED GFR    Collection Time: 20  9:45 AM   Result Value Ref Range    GFR If African American >60 >60 mL/min/1.73 m 2    GFR If Non African American >60 >60 mL/min/1.73 m 2   EKG    Collection Time: 20 10:40 AM   Result Value Ref Range    Report       [facility] Emergency Dept.    Test Date:  2020  Pt Name:    HUNG GRAHAM                 Department: ER  MRN:        [id number]                      Room:        10  Gender:     Male                         Technician: [id number]  :        [id number]                   Requested By:MELVIN CORONA  Order #:    [id number]                    Reading MD:    Measurements  Intervals                                Axis  Rate:       59                           P:          65  ID:         [id number]                          QRS:        17  QRSD:       106                          T:          37  QT:         468  QTc:        464    Interpretive Statements  SINUS BRADYCARDIA  PROBABLE LEFT ATRIAL ABNORMALITY  BORDERLINE INTRAVENTRICULAR CONDUCTION DELAY  No previous ECG available for comparison       Imaging/Testing:    I interpreted and/or reviewed the patient's  neuroimaging    MR-BRAIN-W/O   Final Result      1.  Areas of acute ischemia in the LEFT temporal cortex extending to the LEFT occipital and parietal cortex   2.  No hemorrhage   3.  Mild atrophy   4.  Mild white matter changes      CT-CTA HEAD WITH & W/O-POST PROCESS   Final Result      CT angiogram of the [place] within normal limits for age with intracranial atherosclerosis.      CT-CTA NECK WITH & W/O-POST PROCESSING   Final Result      1. No evidence of flow-limiting stenosis in the cervical carotid or cervical vertebral arteries.      DX-CHEST-PORTABLE (1 VIEW)   Final Result         Patchy bibasilar opacities, likely atelectasis.      Cardiomegaly.      US-CAROTID DOPPLER BILAT   Final Result      EC-ECHOCARDIOGRAM COMPLETE W/O CONT    (Results Pending)       Assessment and Plan:    Abelino Dick is a 59 y.o. male with relevant history of gout and daily marijuana use presenting for numbness whom neurology was consulted to address potential TIA/CVA. Patient states on Sunday at approximately 14:00 he had difficulty undoing pants to use the bathroom, difficulty speaking, and right had tingling. The right hand tingling lasted only for a few seconds, but the difficulty performing tasks and speaking lasted for approximately 2 hours. This morning he began to feel weird and felt left facial numbness. He checked his blood pressure at a [facility] kiosk and stated his blood pressure was high at 175/100 which prompted him and his wife to come to the [facility] Emergency Department today.      Impression: Likely TIA due to transient nature of above mentioned symptoms.    Plan:  -q4h and PRN neuro assessment. VS per nursing/unit protocol. BP goal < 140/90. Antihypertensives per primary team.   -Obtained MRI Brain wo contrast and CTA head and neck w/wo  -Telemetry; currently SR. Screen for Afib/arrhythmia. Obtain TTE with bubble study.   -ASA 81 mg PO daily and Plavix 75mg PO daily for 21 days (from Sunday), then ASA 325mg PO  daily  - Atorvastatin 40 mg PO q HS. Note Total Cholesterol 234, , and HDL 62  - Recommend aggressive BG management per primary team. Avoid IVF with Dextrose. BG goal 140-180. Note hemoglobin A1c 5.2.  -Counseled patient at length regarding life style and risk factor modification including marijuana smoking cessation, Mediterranean diet, and daily exercise for secondary stroke prevention.   -All other medical management per primary team.   -DVT PPX: SCDs.   -Fall precautions discussed.  - outpatient extended cardiac event monitoring for afib (eg. Zio patch vs. Loop)  - follow up in stroke bridge clinic     The plan of care above has been discussed with Dr. Keagan Madrigal.      PINO [email or web address].  [facility]

## 2020-02-12 NOTE — ED NOTES
Assessment completed. Placed on cardiac monitor. SL placed and labs collected. Placed on cardiac monitor.

## 2020-02-14 LAB
AT III ACT/NOR PPP CHRO: 121 % (ref 76–128)
PROT C ACT/NOR PPP: 174 % (ref 83–168)
PROT S ACT/NOR PPP: 133 % (ref 66–143)

## 2020-02-15 LAB — F5 P.R506Q BLD/T QL: NEGATIVE

## 2020-02-17 LAB — F2 C.20210G>A GENO BLD/T: NEGATIVE

## 2020-03-13 ENCOUNTER — OFFICE VISIT (OUTPATIENT)
Dept: CARDIOLOGY | Facility: MEDICAL CENTER | Age: 60
End: 2020-03-13
Payer: COMMERCIAL

## 2020-03-13 VITALS
WEIGHT: 195 LBS | SYSTOLIC BLOOD PRESSURE: 128 MMHG | HEIGHT: 71 IN | HEART RATE: 80 BPM | OXYGEN SATURATION: 94 % | BODY MASS INDEX: 27.3 KG/M2 | DIASTOLIC BLOOD PRESSURE: 64 MMHG

## 2020-03-13 DIAGNOSIS — I63.9 CEREBROVASCULAR ACCIDENT (CVA), UNSPECIFIED MECHANISM (HCC): ICD-10-CM

## 2020-03-13 PROCEDURE — 99204 OFFICE O/P NEW MOD 45 MIN: CPT | Performed by: INTERNAL MEDICINE

## 2020-03-13 RX ORDER — ATORVASTATIN CALCIUM 40 MG/1
40 TABLET, FILM COATED ORAL DAILY
Qty: 90 TAB | Refills: 3 | Status: SHIPPED | OUTPATIENT
Start: 2020-03-13 | End: 2021-04-07

## 2020-03-13 ASSESSMENT — FIBROSIS 4 INDEX: FIB4 SCORE: 1.15

## 2020-03-13 NOTE — PROGRESS NOTES
"CARDIOLOGY NEW PATIENT CONSULTATION    1. Cerebrovascular accident (CVA), unspecified mechanism (HCC)  Left temporal stroke early February.  Nearly full recovery.  PFO is present, but at his age the contribution to stroke is unlikely.  No other identifiable secondary causes.  -30-day month event monitor followed by implanted loop recorder if no A. fib  - Continue statin therapy  -I applauded his efforts at lifestyle changes      Follow up with Willian Stevens M.D. as needed      Chief Complaint   Patient presents with   • Transient Ischemic Attack       History: Abelino Dick is a 60 y.o. male with a past medical history of CVA presenting for consultation regarding stroke.  In early February he had a aphasia for 2 hours before spontaneous recovery.  A MRI showed a left temporal stroke.  He has made nearly full recovery.  In the hospital the secondary stroke evaluation was unrevealing.  He was found to have a PFO.  No arrhythmias were detected and no carotid stenosis was seen.  The echocardiogram was otherwise unremarkable.  He has made dramatic lifestyle changes including regular physical exercise, giving up all smoke and alcohol and eating a vegetarian-based diet.    ROS:  All other systems reviewed and negative except as per the HPI    PE:  /64 (BP Location: Left arm, Patient Position: Sitting, BP Cuff Size: Adult)   Pulse 80   Ht 1.803 m (5' 11\")   Wt 88.5 kg (195 lb)   SpO2 94%   BMI 27.20 kg/m²   GEN: Well appearing  HEENT: Symmetric face. Anicteric sclerae. Moist mucus membranes  NECK: No JVD. No lymphadenopathy  CARDIAC: Normal PMI, regular, normal S1, S2.  VASCULATURE: Normal carotid amplitude without bruit.   RESP: Clear to auscultation bilaterally  ABD: Soft, non-tender, non-distended  EXT: No edema, no clubbing or cyanosis  SKIN: Warm and dry  NEURO: No gross deficits  PSYCH: Appropriate affect, participates in conversation    Past Medical History:   Diagnosis Date   • Gout      History " reviewed. No pertinent surgical history.  No Known Allergies  Outpatient Encounter Medications as of 3/13/2020   Medication Sig Dispense Refill   • aspirin EC (ECOTRIN) 81 MG Tablet Delayed Response Take 81 mg by mouth every day.     • atorvastatin (LIPITOR) 40 MG Tab Take 1 Tab by mouth every day. 90 Tab 3   • [DISCONTINUED] atorvastatin (LIPITOR) 40 MG Tab Take 1 Tab by mouth every day. 30 Tab 0     No facility-administered encounter medications on file as of 3/13/2020.      Social History     Socioeconomic History   • Marital status:      Spouse name: Not on file   • Number of children: Not on file   • Years of education: Not on file   • Highest education level: Not on file   Occupational History   • Not on file   Social Needs   • Financial resource strain: Not on file   • Food insecurity     Worry: Not on file     Inability: Not on file   • Transportation needs     Medical: Not on file     Non-medical: Not on file   Tobacco Use   • Smoking status: Never Smoker   • Smokeless tobacco: Never Used   Substance and Sexual Activity   • Alcohol use: Yes     Comment: occ a beer   • Drug use: Never   • Sexual activity: Yes     Partners: Female   Lifestyle   • Physical activity     Days per week: Not on file     Minutes per session: Not on file   • Stress: Not on file   Relationships   • Social connections     Talks on phone: Not on file     Gets together: Not on file     Attends Jew service: Not on file     Active member of club or organization: Not on file     Attends meetings of clubs or organizations: Not on file     Relationship status: Not on file   • Intimate partner violence     Fear of current or ex partner: Not on file     Emotionally abused: Not on file     Physically abused: Not on file     Forced sexual activity: Not on file   Other Topics Concern   • Not on file   Social History Narrative   • Not on file         Family History   Problem Relation Age of Onset   • Cancer Mother         Lung CA   •  Heart Disease Father          Studies  Lab Results   Component Value Date/Time    CHOLSTRLTOT 234 (H) 02/12/2020 09:45 AM     (H) 02/12/2020 09:45 AM    HDL 62 02/12/2020 09:45 AM    TRIGLYCERIDE 145 02/12/2020 09:45 AM       Lab Results   Component Value Date/Time    SODIUM 137 02/12/2020 09:45 AM    POTASSIUM 4.8 02/12/2020 09:45 AM    CHLORIDE 103 02/12/2020 09:45 AM    CO2 24 02/12/2020 09:45 AM    GLUCOSE 104 (H) 02/12/2020 09:45 AM    BUN 19 02/12/2020 09:45 AM    CREATININE 1.22 02/12/2020 09:45 AM     Lab Results   Component Value Date/Time    ALKPHOSPHAT 35 02/12/2020 09:45 AM    ASTSGOT 31 02/12/2020 09:45 AM    ALTSGPT 27 02/12/2020 09:45 AM    TBILIRUBIN 1.2 02/12/2020 09:45 AM        For this encounter I directly reviewed ECG tracings and medical records I agree with the interpretations in the electronic health record

## 2020-03-26 ENCOUNTER — APPOINTMENT (OUTPATIENT)
Dept: NEUROLOGY | Facility: MEDICAL CENTER | Age: 60
End: 2020-03-26
Payer: COMMERCIAL

## 2020-04-08 ENCOUNTER — TELEPHONE (OUTPATIENT)
Dept: CARDIOLOGY | Facility: MEDICAL CENTER | Age: 60
End: 2020-04-08

## 2020-04-08 ENCOUNTER — NON-PROVIDER VISIT (OUTPATIENT)
Dept: CARDIOLOGY | Facility: MEDICAL CENTER | Age: 60
End: 2020-04-08
Payer: COMMERCIAL

## 2020-04-08 DIAGNOSIS — I47.10 SVT (SUPRAVENTRICULAR TACHYCARDIA) (HCC): ICD-10-CM

## 2020-04-08 DIAGNOSIS — I63.9 CEREBRAL INFARCTION, UNSPECIFIED MECHANISM (HCC): ICD-10-CM

## 2020-04-08 PROCEDURE — 93268 ECG RECORD/REVIEW: CPT | Performed by: INTERNAL MEDICINE

## 2020-04-08 NOTE — TELEPHONE ENCOUNTER
Patient enrolled in the 30 day [facility] Heart monitoring program per Dr. Stevens. Monitor to be shipped to patient by [employer].  >Pending Baseline.  >Pending EOS.

## 2020-04-17 ENCOUNTER — TELEMEDICINE (OUTPATIENT)
Dept: NEUROLOGY | Facility: MEDICAL CENTER | Age: 60
End: 2020-04-17
Payer: COMMERCIAL

## 2020-04-17 VITALS — HEIGHT: 71 IN | BODY MASS INDEX: 27.44 KG/M2 | WEIGHT: 196 LBS

## 2020-04-17 DIAGNOSIS — I63.9 CEREBROVASCULAR ACCIDENT (CVA), UNSPECIFIED MECHANISM (HCC): ICD-10-CM

## 2020-04-17 PROCEDURE — 99443 PR PHYSICIAN TELEPHONE EVALUATION 21-30 MIN: CPT | Mod: CR

## 2020-04-17 ASSESSMENT — FIBROSIS 4 INDEX: FIB4 SCORE: 1.15

## 2020-04-17 NOTE — PROGRESS NOTES
Telemedicine Visit: New Patient     This encounter was conducted via phone conversation ( he could not log onto zoom)   The visit was initiated by the patient who was referred by his primary care provider.  Duration of phone call was 25 minutes   Verbal consent was obtained. Patient's identity was verified.    Subjective:     CC: stroke  Abelino Dick is a 60 y.o. male presenting to establish care and to discuss the evaluation and management of left temporal-occipital-parietal  ischemic stroke in February. He had sudden onset of right sided weakness and numbness and difficulty speaking. He was in a [place] with his wife. Symptoms did not resolve when he got home and after a nights rest he took BP which was 175/100  so he went to ED. While in ED symptoms were much better (lasted about 2h altogether) . He was seen by neurology Dr Madrigal and he had normal exam with no deficits.   He was found to have a very small PFO on echocardiography. No AC was initiated and hypercoagulability panel was ordered which was essentially unremarkable. He was initially managed on ASA+ Plavix then on ASA alone.  He is following with cardiology and currently has holter monitor.  He did not have RY.  He is currently doing well and denies any neurological symptoms.          ROS  See HPI  Constitutional: Negative for fever, chills and malaise/fatigue.   HENT: Negative for congestion.    Eyes: Negative for pain.   Respiratory: Negative for cough and shortness of breath.    Cardiovascular: Negative for leg swelling.   Gastrointestinal: Negative for nausea, vomiting, abdominal pain and diarrhea.   Genitourinary: Negative for dysuria and hematuria.   Skin: Negative for rash.   Neurological: Negative for dizziness, focal weakness and headaches.   Endo/Heme/Allergies: Does not bruise/bleed easily.   Psychiatric/Behavioral: Negative for depression.  The patient is not nervous/anxious.      Not on File    Current medicines (including changes  "today)  Current Outpatient Medications   Medication Sig Dispense Refill   • aspirin EC (ECOTRIN) 81 MG Tablet Delayed Response Take 81 mg by mouth every day.     • atorvastatin (LIPITOR) 40 MG Tab Take 1 Tab by mouth every day. 90 Tab 3     No current facility-administered medications for this visit.        He  has a past medical history of Gout.  He  has no past surgical history on file.      Family History   Problem Relation Age of Onset   • Cancer Mother         Lung CA   • Heart Disease Father      Family Status   Relation Name Status   • Mo     • Fa     • Sis 2 Alive       Patient Active Problem List    Diagnosis Date Noted   • Left foot pain 2015   • Gout 2015   • Hyperlipidemia 2015     Current Outpatient Medications on File Prior to Visit   Medication Sig Dispense Refill   • aspirin EC (ECOTRIN) 81 MG Tablet Delayed Response Take 81 mg by mouth every day.     • atorvastatin (LIPITOR) 40 MG Tab Take 1 Tab by mouth every day. 90 Tab 3     No current facility-administered medications on file prior to visit.         Objective:   Vitals obtained by patient:  Encounter Vitals  Standard Vitals  Vitals  Blood Pressure: (Does not monitor @ at home)  Height: 180.3 cm (5' 11\")  Weight: 88.9 kg (196 lb)  Encounter Vitals  Blood Pressure: (Does not monitor @ at home)  Weight: 88.9 kg (196 lb)  Height: 180.3 cm (5' 11\")  BMI (Calculated): 27.34  Pulmonary-Specific Vitals     Durable Medical Equipment-Specific Vitals             Physical Exam:  aaox 4   Pleasant   No dysarthria  Fluent language   Hearing intact     Imaging personally reviewed    Lab Results   Component Value Date/Time    SODIUM 137 2020 09:45 AM    POTASSIUM 4.8 2020 09:45 AM    CHLORIDE 103 2020 09:45 AM    CO2 24 2020 09:45 AM    GLUCOSE 104 (H) 2020 09:45 AM    BUN 19 2020 09:45 AM    CREATININE 1.22 2020 09:45 AM      Lab Results   Component Value Date/Time    WBC 9.1 " 02/12/2020 09:45 AM    RBC 5.54 02/12/2020 09:45 AM    HEMOGLOBIN 17.7 02/12/2020 09:45 AM    HEMATOCRIT 51.2 02/12/2020 09:45 AM    MCV 92.4 02/12/2020 09:45 AM    MCH 31.9 02/12/2020 09:45 AM    MCHC 34.6 02/12/2020 09:45 AM    MPV 9.9 02/12/2020 09:45 AM    NEUTSPOLYS 74.10 (H) 02/12/2020 09:45 AM    LYMPHOCYTES 17.00 (L) 02/12/2020 09:45 AM    MONOCYTES 7.00 02/12/2020 09:45 AM    EOSINOPHILS 0.40 02/12/2020 09:45 AM    BASOPHILS 0.70 02/12/2020 09:45 AM    HYPOCHROMIA 1+ 01/13/2014 10:02 AM      Assessment and Plan:   L temporal-parietal-occipital stroke in February 2020   Fully resolved with all symptoms and no deficits   TTE with a small PFO which per cardiology not likely to be the cause for his stroke   He has HTN,HL currently on ASA 81 mg and Lipitor   I would consider RY and given he now has holter- loop monitor in the future.  Hypercoagulability panel with mild increase in functional protein C which has no clinical significance.  LDL goal <70 HDL >50  A1C goal <6.5  BP goal <130/90  CTA head  was WNL and so was CTA neck no aneurysm or stenosis in carotids or verts.  Etiology of his stroke may be cardioembolic in nature vs artery to artery vs AS.  Lifestyle changes implemented   antiinflammatory diet   Exercise   Weight loss    RTC 6 months     The following treatment plan was discussed:     There are no diagnoses linked to this encounter.      Follow-up: No follow-ups on file.

## 2020-04-20 NOTE — PATIENT INSTRUCTIONS
Stroke Prevention  Some health problems and behaviors may make it more likely for you to have a stroke. Below are ways to lessen your risk of having a stroke.  · Be active for at least 30 minutes on most or all days.  · Do not smoke. Try not to be around others who smoke.  · Do not drink too much alcohol.  ¨ Do not have more than 2 drinks a day if you are a man.  ¨ Do not have more than 1 drink a day if you are a woman and are not pregnant.  · Eat healthy foods, such as fruits and vegetables. If you were put on a specific diet, follow the diet as told.  · Keep your cholesterol levels under control through diet and medicines. Look for foods that are low in saturated fat, trans fat, cholesterol, and are high in fiber.  · If you have diabetes, follow all diet plans and take your medicine as told.  · Ask your doctor if you need treatment to lower your blood pressure. If you have high blood pressure (hypertension), follow all diet plans and take your medicine as told by your doctor.  · If you are 18-39 years old, have your blood pressure checked every 3-5 years. If you are age 40 or older, have your blood pressure checked every year.  · Keep a healthy weight. Eat foods that are low in calories, salt, saturated fat, trans fat, and cholesterol.  · Do not take drugs.  · Avoid birth control pills, if this applies. Talk to your doctor about the risks of taking birth control pills.  · Talk to your doctor if you have sleep problems (sleep apnea).  · Take all medicine as told by your doctor.  ¨ You may be told to take aspirin or blood thinner medicine. Take this medicine as told by your doctor.  ¨ Understand your medicine instructions.  · Make sure any other conditions you have are being taken care of.  Get help right away if:  · You suddenly lose feeling (you feel numb) or have weakness in your face, arm, or leg.  · Your face or eyelid hangs down to one side.  · You suddenly feel confused.  · You have trouble talking (aphasia)  or understanding what people are saying.  · You suddenly have trouble seeing in one or both eyes.  · You suddenly have trouble walking.  · You are dizzy.  · You lose your balance or your movements are clumsy (uncoordinated).  · You suddenly have a very bad headache and you do not know the cause.  · You have new chest pain.  · Your heart feels like it is fluttering or skipping a beat (irregular heartbeat).  Do not wait to see if the symptoms above go away. Get help right away. Call your local emergency services (911 in [place] Do not drive yourself to the hospital.   This information is not intended to replace advice given to you by your health care provider. Make sure you discuss any questions you have with your health care provider.  Document Released: 06/18/2013 Document Revised: 05/25/2017 Document Reviewed: 06/20/2014  [employer] Interactive Patient Education © 2017 [employer]

## 2020-05-12 DIAGNOSIS — I63.9 CEREBROVASCULAR ACCIDENT (CVA), UNSPECIFIED MECHANISM (HCC): ICD-10-CM

## 2020-05-18 ENCOUNTER — TELEPHONE (OUTPATIENT)
Dept: CARDIOLOGY | Facility: MEDICAL CENTER | Age: 60
End: 2020-05-18

## 2020-05-18 NOTE — TELEPHONE ENCOUNTER
Pt returned call. Discussed monitor results and recommendations for loop recorder. This was discussed during 3/13/20 appt, but pt has hesitations to proceed and would like to discuss further. Scheduled with BE on 6/8/20.

## 2020-05-18 NOTE — TELEPHONE ENCOUNTER
Message   Received: Today   Message Contents   Willian Stevens M.D.  Narda Rios R.N.               Monitor without atrial fibrillation after 30 days of monitoring. Still 1/10 like him would be found to have a diagnosis of atrial fibrillation with longer term monitoring. This predisposes to stroke. Implanted loop recorded advised.      [facility] at [phone] requesting call back to discuss testing results and BE recommendations.

## 2020-06-01 ENCOUNTER — OFFICE VISIT (OUTPATIENT)
Dept: URGENT CARE | Facility: CLINIC | Age: 60
End: 2020-06-01
Payer: COMMERCIAL

## 2020-06-01 VITALS
OXYGEN SATURATION: 95 % | DIASTOLIC BLOOD PRESSURE: 68 MMHG | HEIGHT: 71 IN | TEMPERATURE: 97.6 F | HEART RATE: 60 BPM | WEIGHT: 206 LBS | RESPIRATION RATE: 18 BRPM | SYSTOLIC BLOOD PRESSURE: 140 MMHG | BODY MASS INDEX: 28.84 KG/M2

## 2020-06-01 DIAGNOSIS — M10.9 ACUTE GOUT INVOLVING TOE OF LEFT FOOT, UNSPECIFIED CAUSE: ICD-10-CM

## 2020-06-01 PROCEDURE — 99214 OFFICE O/P EST MOD 30 MIN: CPT | Performed by: PHYSICIAN ASSISTANT

## 2020-06-01 RX ORDER — PREDNISONE 20 MG/1
40 TABLET ORAL DAILY
Qty: 10 TAB | Refills: 0 | Status: SHIPPED | OUTPATIENT
Start: 2020-06-01 | End: 2020-06-06

## 2020-06-01 SDOH — HEALTH STABILITY: MENTAL HEALTH: HOW OFTEN DO YOU HAVE A DRINK CONTAINING ALCOHOL?: MONTHLY OR LESS

## 2020-06-01 ASSESSMENT — ENCOUNTER SYMPTOMS
TINGLING: 0
SHORTNESS OF BREATH: 0
SENSORY CHANGE: 0
COUGH: 0
FALLS: 0
CHILLS: 0
FEVER: 0
EYES NEGATIVE: 1

## 2020-06-01 ASSESSMENT — FIBROSIS 4 INDEX: FIB4 SCORE: 1.15

## 2020-06-01 NOTE — PROGRESS NOTES
"Subjective:      Abelino Dick is a 60 y.o. male who presents with Gout (x5 days, left foot gout irritation)          HPI  Patient is a 60-year-old male who presents complaining of a gout flareup onset 5 days ago.  He states occasionally he gets gout flareups to his left foot and left toe.  Last gout flareup was couple years ago.  States that may be the aspirin may have started the pain.  Mild to moderate pain exacerbated with walking.  He limps due to the pain.  Denies any injury.  Denies any calf pain.  Denies any fever, chills, shortness of breath, chest pain.  He has no other complaints or concerns.      Review of Systems   Constitutional: Negative for chills and fever.   Eyes: Negative.    Respiratory: Negative for cough and shortness of breath.    Cardiovascular: Negative for chest pain.   Musculoskeletal: Positive for joint pain. Negative for falls.   Skin: Negative.    Neurological: Negative for tingling and sensory change.          Objective:     /68   Pulse 60   Temp 36.4 °C (97.6 °F) (Temporal)   Resp 18   Ht 1.803 m (5' 11\")   Wt 93.4 kg (206 lb)   SpO2 95%   BMI 28.73 kg/m²      Physical Exam  Vitals signs reviewed.   Constitutional:       General: He is not in acute distress.     Appearance: Normal appearance. He is not ill-appearing or toxic-appearing.   Eyes:      Conjunctiva/sclera: Conjunctivae normal.      Pupils: Pupils are equal, round, and reactive to light.   Cardiovascular:      Rate and Rhythm: Normal rate and regular rhythm.      Heart sounds: Normal heart sounds.   Pulmonary:      Effort: Pulmonary effort is normal. No respiratory distress.      Breath sounds: Normal breath sounds. No wheezing, rhonchi or rales.   Musculoskeletal:         General: No signs of injury.      Right lower leg: No edema.      Left lower leg: No edema.        Feet:       Comments: Negative calf pain.      Feet:      Comments: Moderate TTP to above areas on left left.   Mild warmth.   Trace edema.   No " surrounding erythema.   Left foot: Full range of motion of flexion, extension,   Strength 5/5 with flexion, extension  Sensation intact.  Capillary Refill < 2 sec.   No abrasions or lacerations or ecchymosis.     Lymphadenopathy:      Cervical: No cervical adenopathy.   Skin:     General: Skin is warm and dry.   Neurological:      General: No focal deficit present.      Mental Status: He is alert and oriented to person, place, and time.   Psychiatric:         Mood and Affect: Mood normal.         Behavior: Behavior normal.       Past Medical History:   Diagnosis Date   • Gout     History reviewed. No pertinent surgical history.   Social History     Socioeconomic History   • Marital status:      Spouse name: Not on file   • Number of children: Not on file   • Years of education: Not on file   • Highest education level: Not on file   Occupational History   • Not on file   Social Needs   • Financial resource strain: Not on file   • Food insecurity     Worry: Not on file     Inability: Not on file   • Transportation needs     Medical: Not on file     Non-medical: Not on file   Tobacco Use   • Smoking status: Never Smoker   • Smokeless tobacco: Never Used   Substance and Sexual Activity   • Alcohol use: Yes     Frequency: Monthly or less     Comment: occ a beer   • Drug use: Never   • Sexual activity: Yes     Partners: Female   Lifestyle   • Physical activity     Days per week: Not on file     Minutes per session: Not on file   • Stress: Not on file   Relationships   • Social connections     Talks on phone: Not on file     Gets together: Not on file     Attends [employer] service: Not on file     Active member of club or organization: Not on file     Attends meetings of clubs or organizations: Not on file     Relationship status: Not on file   • Intimate partner violence     Fear of current or ex partner: Not on file     Emotionally abused: Not on file     Physically abused: Not on file     Forced sexual activity:  Not on file   Other Topics Concern   • Not on file   Social History Narrative   • Not on file    Patient has no allergy information on record.          Assessment/Plan:     1. Acute gout involving toe of left foot, unspecified cause    - predniSONE (DELTASONE) 20 MG Tab; Take 2 Tabs by mouth every day for 5 days.  Dispense: 10 Tab; Refill: 0    Discussed with patient signs symptoms consistent with acute gout flare.    Treatment of prednisone for 5 days.  Discontinue any ibuprofen or NSAIDs during this time.  He may start to take 400 to 600 mg ibuprofen after prednisone treatment if symptoms still persist.  Avoid purine rich foods.  Avoid alcohol consumption.  Follow-up with PCP.    Supportive care, differential diagnoses, and indications for immediate follow-up discussed with patient.    Pathogenesis of diagnosis discussed including typical length and natural progression. Patient expresses understanding and agrees to plan.    Please note that this dictation was created using voice recognition software. I have made every reasonable attempt to correct obvious errors, but I expect that there are errors of grammar and possibly content that I did not discover before finalizing the note.

## 2020-06-04 ENCOUNTER — TELEPHONE (OUTPATIENT)
Dept: CARDIOLOGY | Facility: MEDICAL CENTER | Age: 60
End: 2020-06-04

## 2020-06-04 NOTE — TELEPHONE ENCOUNTER
Called patient to see if he has had the chance to complete fasting blood work previously ordered by BE. Unable to reach patient, left voicemail for call back.

## 2020-07-06 ENCOUNTER — OFFICE VISIT (OUTPATIENT)
Dept: URGENT CARE | Facility: CLINIC | Age: 60
End: 2020-07-06
Payer: COMMERCIAL

## 2020-07-06 VITALS
RESPIRATION RATE: 18 BRPM | BODY MASS INDEX: 28.84 KG/M2 | DIASTOLIC BLOOD PRESSURE: 84 MMHG | HEART RATE: 62 BPM | SYSTOLIC BLOOD PRESSURE: 122 MMHG | TEMPERATURE: 98 F | HEIGHT: 71 IN | WEIGHT: 206 LBS | OXYGEN SATURATION: 97 %

## 2020-07-06 DIAGNOSIS — M10.071 ACUTE IDIOPATHIC GOUT OF RIGHT ANKLE: ICD-10-CM

## 2020-07-06 PROCEDURE — 99214 OFFICE O/P EST MOD 30 MIN: CPT | Performed by: FAMILY MEDICINE

## 2020-07-06 RX ORDER — INDOMETHACIN 50 MG/1
50 CAPSULE ORAL 3 TIMES DAILY
Qty: 60 CAP | Refills: 0 | Status: SHIPPED | OUTPATIENT
Start: 2020-07-06 | End: 2022-05-10

## 2020-07-06 ASSESSMENT — ENCOUNTER SYMPTOMS
VOMITING: 0
TINGLING: 0
FOCAL WEAKNESS: 0
SHORTNESS OF BREATH: 0
FEVER: 0
SENSORY CHANGE: 0

## 2020-07-06 ASSESSMENT — FIBROSIS 4 INDEX: FIB4 SCORE: 1.15

## 2020-07-07 NOTE — PROGRESS NOTES
"Subjective:     Abelino Dick is a 60 y.o. male who presents for Gout (flare up (R) foot-possible gout? swollen)    HPI  Pt presents for evaluation of a new problem   Pt with acute right ankle pain which started without fall or injury  Right ankle with moderate pain  Pain is more along the medial ankle, stays localized, does not radiate  Pain started suddenly and has rapidly progressed  Has associated swelling and slight erythema in the area  Last gout flare was 1 month ago, prior to that was a couple years ago    Review of Systems   Constitutional: Negative for fever.   Respiratory: Negative for shortness of breath.    Cardiovascular: Negative for chest pain.   Gastrointestinal: Negative for vomiting.   Musculoskeletal: Positive for joint pain.   Skin: Positive for rash.   Neurological: Negative for tingling, sensory change and focal weakness.     PMH:  has a past medical history of Gout.  MEDS:   Current Outpatient Medications:   •  aspirin EC (ECOTRIN) 81 MG Tablet Delayed Response, Take 81 mg by mouth every day., Disp: , Rfl:   •  atorvastatin (LIPITOR) 40 MG Tab, Take 1 Tab by mouth every day., Disp: 90 Tab, Rfl: 3  ALLERGIES: No Known Allergies  SURGHX: History reviewed. No pertinent surgical history.  SOCHX:  reports that he has never smoked. He has never used smokeless tobacco. He reports current alcohol use. He reports that he does not use drugs.  FH: Family history was reviewed, not contributing to acute complaint     Objective:   /84 (BP Location: Left arm)   Pulse 62   Temp 36.7 °C (98 °F) (Temporal)   Resp 18   Ht 1.803 m (5' 11\")   Wt 93.4 kg (206 lb)   SpO2 97%   BMI 28.73 kg/m²     Physical Exam  Constitutional:       General: He is not in acute distress.     Appearance: He is well-developed. He is not diaphoretic.   HENT:      Head: Normocephalic and atraumatic.   Pulmonary:      Effort: Pulmonary effort is normal.   Musculoskeletal:      Comments:   Right ankle with tenderness along " medial aspect, mild swelling present, slight erythema and warmth present   Skin:     General: Skin is warm and dry.   Neurological:      Mental Status: He is alert and oriented to person, place, and time.   Psychiatric:         Mood and Affect: Mood normal.         Behavior: Behavior normal.         Thought Content: Thought content normal.         Judgment: Judgment normal.       Assessment/Plan:   Assessment    1. Acute idiopathic gout of right ankle  - indomethacin (INDOCIN) 50 MG Cap; Take 1 Cap by mouth 3 times a day.  Dispense: 60 Cap; Refill: 0    Patient has had gout before.  Responded well to Indocin in the past.  Will give Indocin and follow-up in urgent care on an as-needed basis.

## 2020-08-13 ENCOUNTER — TELEPHONE (OUTPATIENT)
Dept: SCHEDULING | Facility: IMAGING CENTER | Age: 60
End: 2020-08-13

## 2020-08-14 ENCOUNTER — OFFICE VISIT (OUTPATIENT)
Dept: MEDICAL GROUP | Facility: MEDICAL CENTER | Age: 60
End: 2020-08-14
Payer: COMMERCIAL

## 2020-08-14 VITALS
DIASTOLIC BLOOD PRESSURE: 78 MMHG | TEMPERATURE: 96.7 F | OXYGEN SATURATION: 96 % | RESPIRATION RATE: 16 BRPM | HEIGHT: 71 IN | SYSTOLIC BLOOD PRESSURE: 120 MMHG | HEART RATE: 59 BPM | WEIGHT: 202.38 LBS | BODY MASS INDEX: 28.33 KG/M2

## 2020-08-14 DIAGNOSIS — Z23 NEED FOR VACCINATION: ICD-10-CM

## 2020-08-14 DIAGNOSIS — E78.2 MIXED HYPERLIPIDEMIA: ICD-10-CM

## 2020-08-14 DIAGNOSIS — M10.9 GOUT, ARTHRITIS: ICD-10-CM

## 2020-08-14 DIAGNOSIS — Z86.73 H/O TIA (TRANSIENT ISCHEMIC ATTACK) AND STROKE: ICD-10-CM

## 2020-08-14 DIAGNOSIS — Z12.11 COLON CANCER SCREENING: ICD-10-CM

## 2020-08-14 PROCEDURE — 90471 IMMUNIZATION ADMIN: CPT | Performed by: FAMILY MEDICINE

## 2020-08-14 PROCEDURE — 90715 TDAP VACCINE 7 YRS/> IM: CPT | Performed by: FAMILY MEDICINE

## 2020-08-14 PROCEDURE — 99204 OFFICE O/P NEW MOD 45 MIN: CPT | Mod: 25 | Performed by: FAMILY MEDICINE

## 2020-08-14 ASSESSMENT — FIBROSIS 4 INDEX: FIB4 SCORE: 1.15

## 2020-08-14 ASSESSMENT — PATIENT HEALTH QUESTIONNAIRE - PHQ9: CLINICAL INTERPRETATION OF PHQ2 SCORE: 0

## 2020-08-14 NOTE — PROGRESS NOTES
CC: New patient: History of TIA, hyperlipidemia, gout arthritis    HPI:  Abelino presents today to establish new PCP.    Patient has been active and independent with all ADLs.  Has the following chronic medical issues:    H/O TIA (transient ischemic attack) and stroke  Patient had history of TIA in February 2020, no residual.  Has been active.  Currently on atorvastatin 40 mg daily, and aspirin 81 mg daily.    Mixed hyperlipidemia  He has been tolerating the statin. Denies muscle pain LFTs has been normal, on atorvastatin 40 mg daily due to history of TIA..    Gout, arthritis  Patient had 4 episodes in the past 6 years, last one was 6 weeks ago, was treated with indomethacin.  Currently asymptomatic.    Due for colonoscopy, referral placed.  Due for Tdap and shingles vaccine,    Patient Active Problem List    Diagnosis Date Noted   • Left foot pain 01/30/2015   • Gout 01/30/2015   • Hyperlipidemia 01/30/2015       Current Outpatient Medications   Medication Sig Dispense Refill   • indomethacin (INDOCIN) 50 MG Cap Take 1 Cap by mouth 3 times a day. 60 Cap 0   • aspirin EC (ECOTRIN) 81 MG Tablet Delayed Response Take 81 mg by mouth every day.     • atorvastatin (LIPITOR) 40 MG Tab Take 1 Tab by mouth every day. 90 Tab 3     No current facility-administered medications for this visit.          Allergies as of 08/14/2020   • (No Known Allergies)        Social History     Socioeconomic History   • Marital status:      Spouse name: Not on file   • Number of children: Not on file   • Years of education: Not on file   • Highest education level: Not on file   Occupational History   • Not on file   Social Needs   • Financial resource strain: Not on file   • Food insecurity     Worry: Not on file     Inability: Not on file   • Transportation needs     Medical: Not on file     Non-medical: Not on file   Tobacco Use   • Smoking status: Never Smoker   • Smokeless tobacco: Never Used   Substance and Sexual Activity   •  "Alcohol use: Not Currently     Frequency: Monthly or less     Comment: occ a beer   • Drug use: Never   • Sexual activity: Yes     Partners: Female   Lifestyle   • Physical activity     Days per week: Not on file     Minutes per session: Not on file   • Stress: Not on file   Relationships   • Social connections     Talks on phone: Not on file     Gets together: Not on file     Attends Worship service: Not on file     Active member of club or organization: Not on file     Attends meetings of clubs or organizations: Not on file     Relationship status: Not on file   • Intimate partner violence     Fear of current or ex partner: Not on file     Emotionally abused: Not on file     Physically abused: Not on file     Forced sexual activity: Not on file   Other Topics Concern   • Not on file   Social History Narrative   • Not on file       Family History   Problem Relation Age of Onset   • Cancer Mother         Lung CA   • Heart Disease Father        History reviewed. No pertinent surgical history.    ROS:  Denies any Headache, Blurred Vision, Confusion Chest pain,  Shortness of breath,  Abdominal pain, Changes of bowel or bladder, Lower ext edema, Fevers, Nights sweats, Weight Changes, Focal weakness or numbness.  All other systems are negative.    /78 (BP Location: Right arm, Patient Position: Sitting, BP Cuff Size: Adult)   Pulse (!) 59   Temp 35.9 °C (96.7 °F) (Temporal)   Resp 16   Ht 1.803 m (5' 11\")   Wt 91.8 kg (202 lb 6.1 oz)   SpO2 96%   BMI 28.23 kg/m²     Physical Exam:  Gen:         Alert and oriented, No apparent distress.  HEENT:   Perrla, TM clear,  Oralpharynx no erythema or exudates.  Neck:       No Jugular venous distension, Lymphadenopathy, Thyromegaly, Bruits.  Lungs:     Clear to auscultation bilaterally  CV:          Regular rate and rhythm. No murmurs, rubs or gallops.  Abd:         Soft non tender, non distended. Normal active bowel sounds. No                                        " Hepatosplenomegaly, No pulsatile masses.  Ext:          No clubbing, cyanosis, edema.      Assessment and Plan.   60 y.o. male     1. Mixed hyperlipidemia  He has been tolerating the statin. Denies muscle pain LFTs has been normal  Continue on atorvastatin 40 mg daily.    2. Gout, arthritis  Had 4 episodes in the past 6 years,  Advised to avoid high protein diet.  Continue monitor, and treat him for acute episodes as needed.    3. H/O TIA (transient ischemic attack) and stroke  History of TIA in February 2020, no residual.  Continue on atorvastatin and aspirin.    4. Colon cancer screening  Due for colonoscopy, referral placed.    - REFERRAL TO GI FOR COLONOSCOPY    5. Need for vaccination  Due for Tdap and shingles vaccine,  Will give patient Tdap, shingles vaccine is not available  - Tdap =>6yo IM

## 2021-03-15 DIAGNOSIS — Z23 NEED FOR VACCINATION: ICD-10-CM

## 2021-04-06 ENCOUNTER — IMMUNIZATION (OUTPATIENT)
Dept: FAMILY PLANNING/WOMEN'S HEALTH CLINIC | Facility: IMMUNIZATION CENTER | Age: 61
End: 2021-04-06
Attending: INTERNAL MEDICINE
Payer: COMMERCIAL

## 2021-04-06 DIAGNOSIS — Z23 ENCOUNTER FOR VACCINATION: Primary | ICD-10-CM

## 2021-04-06 DIAGNOSIS — Z23 NEED FOR VACCINATION: ICD-10-CM

## 2021-04-06 PROCEDURE — 0001A PFIZER SARS-COV-2 VACCINE: CPT

## 2021-04-06 PROCEDURE — 91300 PFIZER SARS-COV-2 VACCINE: CPT

## 2021-04-30 ENCOUNTER — IMMUNIZATION (OUTPATIENT)
Dept: FAMILY PLANNING/WOMEN'S HEALTH CLINIC | Facility: IMMUNIZATION CENTER | Age: 61
End: 2021-04-30
Payer: COMMERCIAL

## 2021-04-30 DIAGNOSIS — Z23 ENCOUNTER FOR VACCINATION: Primary | ICD-10-CM

## 2021-04-30 PROCEDURE — 0002A PFIZER SARS-COV-2 VACCINE: CPT

## 2021-04-30 PROCEDURE — 91300 PFIZER SARS-COV-2 VACCINE: CPT

## 2021-07-15 DIAGNOSIS — I63.9 CEREBROVASCULAR ACCIDENT (CVA), UNSPECIFIED MECHANISM (HCC): ICD-10-CM

## 2021-07-15 RX ORDER — ATORVASTATIN CALCIUM 40 MG/1
40 TABLET, FILM COATED ORAL
Qty: 90 TABLET | Refills: 0 | Status: SHIPPED | OUTPATIENT
Start: 2021-07-15 | End: 2021-11-18

## 2021-07-16 ENCOUNTER — TELEPHONE (OUTPATIENT)
Dept: CARDIOLOGY | Facility: MEDICAL CENTER | Age: 61
End: 2021-07-16

## 2021-11-18 DIAGNOSIS — I63.9 CEREBROVASCULAR ACCIDENT (CVA), UNSPECIFIED MECHANISM (HCC): ICD-10-CM

## 2021-11-18 RX ORDER — ATORVASTATIN CALCIUM 40 MG/1
40 TABLET, FILM COATED ORAL
Qty: 90 TABLET | Refills: 0 | Status: SHIPPED | OUTPATIENT
Start: 2021-11-18 | End: 2022-03-17

## 2021-11-19 ENCOUNTER — TELEPHONE (OUTPATIENT)
Dept: CARDIOLOGY | Facility: MEDICAL CENTER | Age: 61
End: 2021-11-19

## 2021-11-19 NOTE — TELEPHONE ENCOUNTER
----- Message from Narda Gloria R.N. sent at 11/18/2021  3:54 PM PST -----  Pt overdue for f/u. Please contact to schedule appt.  Thank you!

## 2022-03-17 DIAGNOSIS — I63.9 CEREBROVASCULAR ACCIDENT (CVA), UNSPECIFIED MECHANISM (HCC): ICD-10-CM

## 2022-03-17 DIAGNOSIS — E78.5 HYPERLIPIDEMIA, UNSPECIFIED HYPERLIPIDEMIA TYPE: ICD-10-CM

## 2022-03-17 RX ORDER — ATORVASTATIN CALCIUM 40 MG/1
40 TABLET, FILM COATED ORAL
Qty: 30 TABLET | Refills: 0 | Status: SHIPPED | OUTPATIENT
Start: 2022-03-17 | End: 2022-04-19

## 2022-04-01 ENCOUNTER — TELEPHONE (OUTPATIENT)
Dept: CARDIOLOGY | Facility: MEDICAL CENTER | Age: 62
End: 2022-04-01
Payer: COMMERCIAL

## 2022-04-01 NOTE — TELEPHONE ENCOUNTER
Be    Pt has been out of town and is now returning your calls. Scheduled a fv appt in May for him.  Please reach back out [phone].    Thank You  Aure PHILLIPS

## 2022-04-05 ENCOUNTER — OFFICE VISIT (OUTPATIENT)
Dept: MEDICAL GROUP | Facility: MEDICAL CENTER | Age: 62
End: 2022-04-05
Payer: COMMERCIAL

## 2022-04-05 VITALS
WEIGHT: 204.59 LBS | HEART RATE: 87 BPM | TEMPERATURE: 98.2 F | OXYGEN SATURATION: 95 % | RESPIRATION RATE: 16 BRPM | SYSTOLIC BLOOD PRESSURE: 140 MMHG | DIASTOLIC BLOOD PRESSURE: 90 MMHG | HEIGHT: 71 IN | BODY MASS INDEX: 28.64 KG/M2

## 2022-04-05 DIAGNOSIS — R42 DIZZINESS: ICD-10-CM

## 2022-04-05 PROCEDURE — 99214 OFFICE O/P EST MOD 30 MIN: CPT | Performed by: FAMILY MEDICINE

## 2022-04-05 RX ORDER — MECLIZINE HYDROCHLORIDE 25 MG/1
25 TABLET ORAL 2 TIMES DAILY PRN
Qty: 20 TABLET | Refills: 0 | Status: SHIPPED | OUTPATIENT
Start: 2022-04-05 | End: 2022-05-10

## 2022-04-05 ASSESSMENT — PATIENT HEALTH QUESTIONNAIRE - PHQ9: CLINICAL INTERPRETATION OF PHQ2 SCORE: 0

## 2022-04-05 NOTE — PROGRESS NOTES
"CC: Dizziness/vertigo    HPI:   Abelino presents today because he has been having dizziness/ vertigo for 2 days, however it has gotten better.  Patient is a drum player in a band, he was playing all Saturday night and was very loud in the that place, he felt a lot of pressure in his ears , next day he wake up with some dizziness and vertigo when he stands up and when he laid down.  However is gradually getting better.  Denies any nausea or vomiting, he has been having ringing in both ear that is now getting better, and mild headache.  Denies any hearing problem.      Patient Active Problem List    Diagnosis Date Noted   • Left foot pain 01/30/2015   • Gout 01/30/2015   • Hyperlipidemia 01/30/2015       Current Outpatient Medications   Medication Sig Dispense Refill   • meclizine (ANTIVERT) 25 MG Tab Take 1 Tablet by mouth 2 times a day as needed. 20 Tablet 0   • atorvastatin (LIPITOR) 40 MG Tab Take 1 Tablet by mouth every day. Please call [phone] to schedule follow up visit to ensure future refills. Thanks 30 Tablet 0   • indomethacin (INDOCIN) 50 MG Cap Take 1 Cap by mouth 3 times a day. 60 Cap 0   • aspirin EC (ECOTRIN) 81 MG Tablet Delayed Response Take 81 mg by mouth every day.       No current facility-administered medications for this visit.         Allergies as of 04/05/2022   • (No Known Allergies)        ROS: Denies any chest pain, Shortness of breath, Changes bowel or bladder, Lower extremity edema.    Physical Exam:  /90 (BP Location: Right arm, Patient Position: Sitting, BP Cuff Size: Adult)   Pulse 87   Temp 36.8 °C (98.2 °F) (Temporal)   Resp 16   Ht 1.803 m (5' 11\") Comment: patient stated  Wt 92.8 kg (204 lb 9.4 oz)   SpO2 95%   BMI 28.53 kg/m²   Gen.: Well-developed, well-nourished, no apparent distress,pleasant and cooperative with the examination  Ears; normal external ear tympanic membrane bilaterally.  Small amount of wax on the right ear        Assessment and Plan.   62 y.o. " male     1. Dizziness  Probably paroxysmal benign positional vertigo, as per patient now his symptoms is getting better.  I will start patient on meclizine to be taken as needed  We will continue monitor patient's condition, if no improvement will send patient for this vestibular physical therapy.  And if no improvement will rule out other medical problems.  Patient advised to return to the clinic if no improvement.    - meclizine (ANTIVERT) 25 MG Tab; Take 1 Tablet by mouth 2 times a day as needed.  Dispense: 20 Tablet; Refill: 0

## 2022-04-17 DIAGNOSIS — I63.9 CEREBROVASCULAR ACCIDENT (CVA), UNSPECIFIED MECHANISM (HCC): ICD-10-CM

## 2022-04-19 RX ORDER — ATORVASTATIN CALCIUM 40 MG/1
TABLET, FILM COATED ORAL
Qty: 30 TABLET | Refills: 0 | Status: SHIPPED | OUTPATIENT
Start: 2022-04-19 | End: 2022-05-06 | Stop reason: SDUPTHER

## 2022-05-04 ENCOUNTER — HOSPITAL ENCOUNTER (OUTPATIENT)
Dept: LAB | Facility: MEDICAL CENTER | Age: 62
End: 2022-05-04
Attending: INTERNAL MEDICINE
Payer: COMMERCIAL

## 2022-05-04 DIAGNOSIS — E78.5 HYPERLIPIDEMIA, UNSPECIFIED HYPERLIPIDEMIA TYPE: ICD-10-CM

## 2022-05-04 LAB
CHOLEST SERPL-MCNC: 157 MG/DL (ref 100–199)
FASTING STATUS PATIENT QL REPORTED: NORMAL
HDLC SERPL-MCNC: 68 MG/DL
LDLC SERPL CALC-MCNC: 73 MG/DL
TRIGL SERPL-MCNC: 79 MG/DL (ref 0–149)

## 2022-05-04 PROCEDURE — 36415 COLL VENOUS BLD VENIPUNCTURE: CPT

## 2022-05-04 PROCEDURE — 80061 LIPID PANEL: CPT

## 2022-05-06 ENCOUNTER — TELEPHONE (OUTPATIENT)
Dept: CARDIOLOGY | Facility: MEDICAL CENTER | Age: 62
End: 2022-05-06
Payer: COMMERCIAL

## 2022-05-06 DIAGNOSIS — E78.5 HYPERLIPIDEMIA, UNSPECIFIED HYPERLIPIDEMIA TYPE: ICD-10-CM

## 2022-05-06 DIAGNOSIS — I63.9 CEREBROVASCULAR ACCIDENT (CVA), UNSPECIFIED MECHANISM (HCC): ICD-10-CM

## 2022-05-06 RX ORDER — ATORVASTATIN CALCIUM 80 MG/1
80 TABLET, FILM COATED ORAL EVERY EVENING
Qty: 90 TABLET | Refills: 0 | Status: SHIPPED | OUTPATIENT
Start: 2022-05-06 | End: 2022-06-29

## 2022-05-07 NOTE — TELEPHONE ENCOUNTER
Lab orders placed and put in mail for patient. Updated RX sent to preferred pharmacy on file. Left message for patient to return call and discuss further. MyChart message also sent.

## 2022-05-07 NOTE — TELEPHONE ENCOUNTER
----- Message from Willian Stevens M.D. sent at 5/5/2022  6:32 PM PDT -----  Cholesterol looks good but generally it is preferred to achieve an LDL less than 70. I suggest doubling atorvastatin to 80 mg. Repeat lipid panel in three months

## 2022-05-10 ENCOUNTER — OFFICE VISIT (OUTPATIENT)
Dept: CARDIOLOGY | Facility: MEDICAL CENTER | Age: 62
End: 2022-05-10
Payer: COMMERCIAL

## 2022-05-10 VITALS
OXYGEN SATURATION: 96 % | WEIGHT: 205 LBS | SYSTOLIC BLOOD PRESSURE: 122 MMHG | HEIGHT: 71 IN | RESPIRATION RATE: 16 BRPM | BODY MASS INDEX: 28.7 KG/M2 | DIASTOLIC BLOOD PRESSURE: 70 MMHG | HEART RATE: 66 BPM

## 2022-05-10 DIAGNOSIS — E78.5 DYSLIPIDEMIA: ICD-10-CM

## 2022-05-10 DIAGNOSIS — I63.9 CEREBROVASCULAR ACCIDENT (CVA), UNSPECIFIED MECHANISM (HCC): ICD-10-CM

## 2022-05-10 PROCEDURE — 99214 OFFICE O/P EST MOD 30 MIN: CPT | Performed by: INTERNAL MEDICINE

## 2022-05-10 NOTE — PROGRESS NOTES
"CARDIOLOGY OUTPATIENT FOLLOWUP    PCP: Darin Sebastian M.D.    1. Cerebrovascular accident (CVA), unspecified mechanism (HCC)    2. Dyslipidemia        Abelino Dick has had no recurrence of CVA or strokelike symptoms found the episode in 2020.  I advised continued risk factor modification, recommended the increased dosage of atorvastatin 80 mg daily with plans to repeat a lipid profile and 3 months time.  I also counseled about healthy lifestyle practices    Follow up: as needed    Chief Complaint   Patient presents with   • Other     F/V Dx: Cerebrovascular accident (CVA), unspecified mechanism (HCC)   • Hyperlipidemia       History: Abelino Dick is a 62 y.o. male with history of stroke February 2020 presenting for follow-up.  He is followed of healthy lifestyle practices this is including not exercising regularly and not eating well.  Recent lipid panel showed mild elevation in the LDL cholesterol.  He has had an apple watch and has recently reprogrammed it to turn on arrhythmia detection.  A few years ago a 30-day monitor showed no A. fib and he was not interested in a implanted loop recorder.      ROS:   10 point review systems is otherwise negative except as per the HPI    PE:  /70 (BP Location: Left arm, Patient Position: Sitting, BP Cuff Size: Adult)   Pulse 66   Resp 16   Ht 1.803 m (5' 11\")   Wt 93 kg (205 lb)   SpO2 96%   BMI 28.59 kg/m²   Gen: no acute distress  HEENT: Symmetric face. Anicteric sclerae. Moist mucus membranes  NECK: No JVD. No lymphadenopathy  CARDIAC: Regular, Normal S1, S2, No murmur  VASCULATURE: carotids are normal bilaterally without bruit  RESP: Clear to auscultation bilaterally  ABD: Soft, non-tender, non-distended  EXT: No edema, no clubbing or cyanosis  SKIN: Warm and dry  NEURO: No gross deficits  PSYCH: Appropriate affect, participates in conversation    The ASCVD Risk score (Preston DC Jr, et al., 2013) failed to calculate.    Past Medical History:   Diagnosis " Date   • Gout      No Known Allergies  Outpatient Encounter Medications as of 5/10/2022   Medication Sig Dispense Refill   • aspirin EC (ECOTRIN) 81 MG Tablet Delayed Response Take 1 Tablet by mouth every day. 90 Tablet 3   • atorvastatin (LIPITOR) 80 MG tablet Take 1 Tablet by mouth every evening. 90 Tablet 0   • [DISCONTINUED] meclizine (ANTIVERT) 25 MG Tab Take 1 Tablet by mouth 2 times a day as needed. (Patient not taking: Reported on 5/10/2022) 20 Tablet 0   • [DISCONTINUED] indomethacin (INDOCIN) 50 MG Cap Take 1 Cap by mouth 3 times a day. (Patient not taking: Reported on 5/10/2022) 60 Cap 0   • [DISCONTINUED] aspirin EC (ECOTRIN) 81 MG Tablet Delayed Response Take 81 mg by mouth every day. (Patient not taking: Reported on 5/10/2022)       No facility-administered encounter medications on file as of 5/10/2022.     Social History     Socioeconomic History   • Marital status:      Spouse name: Not on file   • Number of children: Not on file   • Years of education: Not on file   • Highest education level: Not on file   Occupational History   • Not on file   Tobacco Use   • Smoking status: Never Smoker   • Smokeless tobacco: Never Used   Vaping Use   • Vaping Use: Never used   Substance and Sexual Activity   • Alcohol use: Yes     Comment: occ a beer   • Drug use: Never   • Sexual activity: Yes     Partners: Female   Other Topics Concern   • Not on file   Social History Narrative   • Not on file     Social Determinants of Health     Financial Resource Strain: Not on file   Food Insecurity: Not on file   Transportation Needs: Not on file   Physical Activity: Not on file   Stress: Not on file   Social Connections: Not on file   Intimate Partner Violence: Not on file   Housing Stability: Not on file       Studies  Lab Results   Component Value Date/Time    CHOLSTRLTOT 157 05/04/2022 06:40 AM    LDL 73 05/04/2022 06:40 AM    HDL 68 05/04/2022 06:40 AM    TRIGLYCERIDE 79 05/04/2022 06:40 AM       Lab Results    Component Value Date/Time    SODIUM 137 02/12/2020 09:45 AM    POTASSIUM 4.8 02/12/2020 09:45 AM    CHLORIDE 103 02/12/2020 09:45 AM    CO2 24 02/12/2020 09:45 AM    GLUCOSE 104 (H) 02/12/2020 09:45 AM    BUN 19 02/12/2020 09:45 AM    CREATININE 1.22 02/12/2020 09:45 AM     Lab Results   Component Value Date/Time    ALKPHOSPHAT 35 02/12/2020 09:45 AM    ASTSGOT 31 02/12/2020 09:45 AM    ALTSGPT 27 02/12/2020 09:45 AM    TBILIRUBIN 1.2 02/12/2020 09:45 AM        For this encounter I reviewed the following medical records BMP, Lipid profile and CBC

## 2024-08-22 ENCOUNTER — DOCUMENTATION (OUTPATIENT)
Dept: HEALTH INFORMATION MANAGEMENT | Facility: OTHER | Age: 64
End: 2024-08-22
Payer: COMMERCIAL